# Patient Record
Sex: FEMALE | Race: WHITE | NOT HISPANIC OR LATINO | Employment: OTHER | URBAN - NONMETROPOLITAN AREA
[De-identification: names, ages, dates, MRNs, and addresses within clinical notes are randomized per-mention and may not be internally consistent; named-entity substitution may affect disease eponyms.]

---

## 2018-04-23 DIAGNOSIS — M25.561 RIGHT KNEE PAIN, UNSPECIFIED CHRONICITY: Primary | ICD-10-CM

## 2018-04-24 ENCOUNTER — OFFICE VISIT (OUTPATIENT)
Dept: ORTHOPEDIC SURGERY | Facility: CLINIC | Age: 73
End: 2018-04-24

## 2018-04-24 VITALS — HEIGHT: 62 IN | WEIGHT: 192 LBS | BODY MASS INDEX: 35.33 KG/M2 | RESPIRATION RATE: 16 BRPM

## 2018-04-24 DIAGNOSIS — M25.561 ARTHRALGIA OF KNEE, RIGHT: Primary | ICD-10-CM

## 2018-04-24 DIAGNOSIS — M94.261 CHONDROMALACIA OF RIGHT KNEE: ICD-10-CM

## 2018-04-24 PROCEDURE — 99204 OFFICE O/P NEW MOD 45 MIN: CPT | Performed by: ORTHOPAEDIC SURGERY

## 2018-04-24 RX ORDER — ASPIRIN 81 MG/1
81 TABLET, CHEWABLE ORAL DAILY
COMMUNITY

## 2018-04-24 RX ORDER — OMEPRAZOLE 20 MG/1
40 CAPSULE, DELAYED RELEASE ORAL DAILY
COMMUNITY

## 2018-04-24 RX ORDER — AMLODIPINE BESYLATE 5 MG/1
5 TABLET ORAL DAILY
COMMUNITY
End: 2019-10-15

## 2018-04-24 RX ORDER — TRIAMCINOLONE ACETONIDE 0.25 MG/G
CREAM TOPICAL 2 TIMES DAILY
COMMUNITY
End: 2019-10-15

## 2018-04-24 RX ORDER — LEVOCETIRIZINE DIHYDROCHLORIDE 5 MG/1
5 TABLET, FILM COATED ORAL EVERY EVENING
COMMUNITY

## 2018-04-24 RX ORDER — CHLORTHALIDONE 25 MG/1
25 TABLET ORAL DAILY
COMMUNITY
End: 2019-10-15

## 2018-04-24 RX ORDER — PAROXETINE 10 MG/1
10 TABLET, FILM COATED ORAL EVERY MORNING
COMMUNITY

## 2018-04-24 RX ORDER — IRBESARTAN 300 MG/1
300 TABLET ORAL NIGHTLY
COMMUNITY
End: 2019-10-15

## 2018-04-24 RX ORDER — ERGOCALCIFEROL 1.25 MG/1
50000 CAPSULE ORAL WEEKLY
COMMUNITY

## 2018-04-24 NOTE — PROGRESS NOTES
Subjective   Patient ID: Leti Castañeda is a 73 y.o. female  Pain of the Right Knee (Patient has had right knee pain for over a year, denies any injury. She had standing x rays done on 8-8-17 at [facility]. Right hand dominant.)             History of Present Illness    To 3 months of increasing pain in the right knee stiffness tightness swelling constant pain feels relief with it slightly flexed at the end of a recliner chair, initial shot of cortisone gave her wonderful relief, 2 other injections gave her very little or no relief.  Recent x-rays were negative for fracture she comes in today to discuss further treatment.  Denies hip back pain paresthesias fall injury trauma or loss of extension actively.    Review of Systems   Constitutional: Negative for diaphoresis, fever and unexpected weight change.   HENT: Negative for dental problem and sore throat.    Eyes: Negative for visual disturbance.   Respiratory: Negative for shortness of breath.    Cardiovascular: Negative for chest pain.   Gastrointestinal: Negative for abdominal pain, constipation, diarrhea, nausea and vomiting.   Genitourinary: Negative for difficulty urinating and frequency.   Musculoskeletal: Positive for arthralgias.   Neurological: Negative for headaches.   Hematological: Does not bruise/bleed easily.   All other systems reviewed and are negative.      Past Medical History:   Diagnosis Date   • Histoplasmosis    • Hypertension    • Osteoporosis    • Right wrist sprain         Past Surgical History:   Procedure Laterality Date   • ABDOMINAL AORTA STENT     • CHOLECYSTECTOMY     • COLON SURGERY         Family History   Problem Relation Age of Onset   • Hypertension Other    • Diabetes Other        Social History     Social History   • Marital status:      Spouse name: N/A   • Number of children: N/A   • Years of education: N/A     Occupational History   • Not on file.     Social History Main Topics   • Smoking status: Never  "Smoker   • Smokeless tobacco: Not on file   • Alcohol use No   • Drug use: No   • Sexual activity: Defer     Other Topics Concern   • Not on file     Social History Narrative   • No narrative on file       I have reviewed all of the above social hx, family hx, surgical hx, medications, allergies & ROS and confirm that it is accurate.    Allergies   Allergen Reactions   • Codeine GI Intolerance       Objective   Resp 16   Ht 157.5 cm (62\")   Wt 87.1 kg (192 lb)   BMI 35.12 kg/m²    Physical Exam  Constitutional: Patient is oriented to person, place, and time. Patient appears well-developed and well-nourished.   HENT:Head: Normocephalic and atraumatic.   Eyes: EOM are normal. Pupils are equal, round, and reactive to light.   Neck: Normal range of motion. Neck supple.   Cardiovascular: Normal rate.    Pulmonary/Chest: Effort normal and breath sounds normal.   Abdominal: Soft.   Neurological: Patient is alert and oriented to person, place, and time.   Skin: Skin is warm and dry.   Psychiatric: Patient has a normal mood and affect.   Nursing note and vitals reviewed.       Ortho Exam     Right knee with 2+ effusion tenderness over the anteromedial joint line, negative Lockman sign no instability with MCL stress calf supple no edema in the ankle extensor mechanism intact no tenderness of the quadriceps or patellar tendon areas.  Assessment/Plan   Review of Radiographic Studies:    Radiographic images today of affected area I personally viewed and showed no sign of acute fracture or dislocation.      Procedures     Leti was seen today for pain.    Diagnoses and all orders for this visit:    Arthralgia of knee, right  -     Cancel: XR Knee 1 or 2 View Right  -     XR Knee 3 View Right  -     MRI Knee Right Without Contrast; Future    Chondromalacia of right knee       Orthopedic activities reviewed and patient expressed appreciation and Risk, benefits, and merits of treatment alternatives reviewed with the patient and " questions answered      Recommendations/Plan:   Exercise, medications, injections, other patient advice, and return appointment as noted.    Patient agreeable to call or return sooner for any concerns.             Impression:  Right knee pain swelling probable degenerative meniscal tear with failure to improve with 3 prior cortisone injections  Plan:  MRI right knee, discuss arthroscopic debridement next visit, she will continue with icing and local modalities and meantime

## 2018-05-07 ENCOUNTER — HOSPITAL ENCOUNTER (OUTPATIENT)
Dept: MRI IMAGING | Facility: HOSPITAL | Age: 73
Discharge: HOME | End: 2018-05-07
Attending: ORTHOPAEDIC SURGERY | Admitting: ORTHOPAEDIC SURGERY

## 2018-05-07 DIAGNOSIS — M25.561 ARTHRALGIA OF KNEE, RIGHT: ICD-10-CM

## 2018-05-07 PROCEDURE — 73721 MRI JNT OF LWR EXTRE W/O DYE: CPT

## 2018-05-15 ENCOUNTER — PREP FOR SURGERY (OUTPATIENT)
Dept: OTHER | Facility: HOSPITAL | Age: 73
End: 2018-05-15

## 2018-05-15 ENCOUNTER — APPOINTMENT (OUTPATIENT)
Dept: MRI IMAGING | Facility: HOSPITAL | Age: 73
End: 2018-05-15
Attending: ORTHOPAEDIC SURGERY

## 2018-05-15 ENCOUNTER — APPOINTMENT (OUTPATIENT)
Dept: PREADMISSION TESTING | Facility: HOSPITAL | Age: 73
End: 2018-05-15

## 2018-05-15 ENCOUNTER — OFFICE VISIT (OUTPATIENT)
Dept: ORTHOPEDIC SURGERY | Facility: CLINIC | Age: 73
End: 2018-05-15

## 2018-05-15 ENCOUNTER — HOSPITAL ENCOUNTER (OUTPATIENT)
Dept: GENERAL RADIOLOGY | Facility: HOSPITAL | Age: 73
Discharge: HOME | End: 2018-05-15
Admitting: ORTHOPAEDIC SURGERY

## 2018-05-15 VITALS — HEIGHT: 62 IN | RESPIRATION RATE: 18 BRPM | WEIGHT: 192 LBS | BODY MASS INDEX: 35.33 KG/M2

## 2018-05-15 VITALS — WEIGHT: 195 LBS | BODY MASS INDEX: 35.88 KG/M2 | HEIGHT: 62 IN

## 2018-05-15 DIAGNOSIS — Z01.818 PREOP TESTING: ICD-10-CM

## 2018-05-15 DIAGNOSIS — S83.271D COMPLEX TEAR OF LATERAL MENISCUS OF RIGHT KNEE AS CURRENT INJURY, SUBSEQUENT ENCOUNTER: ICD-10-CM

## 2018-05-15 DIAGNOSIS — M25.561 ARTHRALGIA OF KNEE, RIGHT: Primary | ICD-10-CM

## 2018-05-15 DIAGNOSIS — Z01.818 PREOP TESTING: Primary | ICD-10-CM

## 2018-05-15 LAB
ALBUMIN SERPL-MCNC: 4.6 G/DL (ref 3.5–5)
ALBUMIN/GLOB SERPL: 1.4 G/DL (ref 1–2)
ALP SERPL-CCNC: 69 U/L (ref 38–126)
ALT SERPL W P-5'-P-CCNC: 47 U/L (ref 13–69)
ANION GAP SERPL CALCULATED.3IONS-SCNC: 16 MMOL/L (ref 10–20)
AST SERPL-CCNC: 39 U/L (ref 15–46)
BASOPHILS # BLD AUTO: 0.06 10*3/MM3 (ref 0–0.2)
BASOPHILS NFR BLD AUTO: 1 % (ref 0–2.5)
BILIRUB SERPL-MCNC: 0.8 MG/DL (ref 0.2–1.3)
BILIRUB UR QL STRIP: NEGATIVE
BUN BLD-MCNC: 29 MG/DL (ref 7–20)
BUN/CREAT SERPL: 32.2 (ref 7.1–23.5)
CALCIUM SPEC-SCNC: 9.7 MG/DL (ref 8.4–10.2)
CHLORIDE SERPL-SCNC: 98 MMOL/L (ref 98–107)
CLARITY UR: CLEAR
CO2 SERPL-SCNC: 31 MMOL/L (ref 26–30)
COLOR UR: YELLOW
CREAT BLD-MCNC: 0.9 MG/DL (ref 0.6–1.3)
DEPRECATED RDW RBC AUTO: 44.6 FL (ref 37–54)
EOSINOPHIL # BLD AUTO: 0.15 10*3/MM3 (ref 0–0.7)
EOSINOPHIL NFR BLD AUTO: 2.4 % (ref 0–7)
ERYTHROCYTE [DISTWIDTH] IN BLOOD BY AUTOMATED COUNT: 12.7 % (ref 11.5–14.5)
GFR SERPL CREATININE-BSD FRML MDRD: 61 ML/MIN/1.73
GLOBULIN UR ELPH-MCNC: 3.4 GM/DL
GLUCOSE BLD-MCNC: 111 MG/DL (ref 74–98)
GLUCOSE UR STRIP-MCNC: NEGATIVE MG/DL
HCT VFR BLD AUTO: 41.2 % (ref 37–47)
HGB BLD-MCNC: 13.9 G/DL (ref 12–16)
HGB UR QL STRIP.AUTO: NEGATIVE
IMM GRANULOCYTES # BLD: 0.01 10*3/MM3 (ref 0–0.06)
IMM GRANULOCYTES NFR BLD: 0.2 % (ref 0–0.6)
KETONES UR QL STRIP: NEGATIVE
LEUKOCYTE ESTERASE UR QL STRIP.AUTO: NEGATIVE
LYMPHOCYTES # BLD AUTO: 1.64 10*3/MM3 (ref 0.6–3.4)
LYMPHOCYTES NFR BLD AUTO: 26.1 % (ref 10–50)
MCH RBC QN AUTO: 32.1 PG (ref 27–31)
MCHC RBC AUTO-ENTMCNC: 33.7 G/DL (ref 30–37)
MCV RBC AUTO: 95.2 FL (ref 81–99)
MONOCYTES # BLD AUTO: 0.5 10*3/MM3 (ref 0–0.9)
MONOCYTES NFR BLD AUTO: 8 % (ref 0–12)
NEUTROPHILS # BLD AUTO: 3.92 10*3/MM3 (ref 2–6.9)
NEUTROPHILS NFR BLD AUTO: 62.3 % (ref 37–80)
NITRITE UR QL STRIP: NEGATIVE
NRBC BLD MANUAL-RTO: 0 /100 WBC (ref 0–0)
PH UR STRIP.AUTO: 6.5 [PH] (ref 5–8)
PLATELET # BLD AUTO: 258 10*3/MM3 (ref 130–400)
PMV BLD AUTO: 11.1 FL (ref 6–12)
POTASSIUM BLD-SCNC: 4 MMOL/L (ref 3.5–5.1)
PROT SERPL-MCNC: 8 G/DL (ref 6.3–8.2)
PROT UR QL STRIP: NEGATIVE
RBC # BLD AUTO: 4.33 10*6/MM3 (ref 4.2–5.4)
SODIUM BLD-SCNC: 141 MMOL/L (ref 137–145)
SP GR UR STRIP: 1.01 (ref 1–1.03)
UROBILINOGEN UR QL STRIP: NORMAL
WBC NRBC COR # BLD: 6.28 10*3/MM3 (ref 4.8–10.8)

## 2018-05-15 PROCEDURE — 36415 COLL VENOUS BLD VENIPUNCTURE: CPT

## 2018-05-15 PROCEDURE — 81003 URINALYSIS AUTO W/O SCOPE: CPT | Performed by: ORTHOPAEDIC SURGERY

## 2018-05-15 PROCEDURE — 80053 COMPREHEN METABOLIC PANEL: CPT | Performed by: ORTHOPAEDIC SURGERY

## 2018-05-15 PROCEDURE — 93005 ELECTROCARDIOGRAM TRACING: CPT | Performed by: ORTHOPAEDIC SURGERY

## 2018-05-15 PROCEDURE — 71046 X-RAY EXAM CHEST 2 VIEWS: CPT

## 2018-05-15 PROCEDURE — 85025 COMPLETE CBC W/AUTO DIFF WBC: CPT | Performed by: ORTHOPAEDIC SURGERY

## 2018-05-15 PROCEDURE — 99214 OFFICE O/P EST MOD 30 MIN: CPT | Performed by: ORTHOPAEDIC SURGERY

## 2018-05-15 PROCEDURE — 87081 CULTURE SCREEN ONLY: CPT | Performed by: ORTHOPAEDIC SURGERY

## 2018-05-15 RX ORDER — CHLORAL HYDRATE 500 MG
1000 CAPSULE ORAL NIGHTLY
COMMUNITY
End: 2020-02-04

## 2018-05-15 RX ORDER — ALENDRONATE SODIUM 70 MG/1
70 TABLET ORAL
COMMUNITY
End: 2019-10-15

## 2018-05-15 NOTE — PROGRESS NOTES
Subjective   Patient ID: Leti Castañeda is a 73 y.o. female  Follow-up and Pain of the Right Knee (Patient is here today for her right knee MRI results. She states she is still having some pain.)             History of Present Illness    73-year-old with history of right lateral knee pain initially improved with first shot of cortisone had 2 subsequent shots which did not provide relief, went for an MRI which showed degenerative tear anterolateral meniscus right knee and is here to discuss surgery for the right knee.    Medical history positive for intermittent pancreatitis which is been stable recently, has history of colon cancer status post surgery, also has history of aortic stent followed by her PCP Dr. Tee    Review of Systems   Constitutional: Negative for fever.   HENT: Negative for voice change.    Eyes: Negative for visual disturbance.   Respiratory: Negative for shortness of breath.    Cardiovascular: Negative for chest pain.   Gastrointestinal: Negative for abdominal distention and abdominal pain.   Genitourinary: Negative for dysuria.   Musculoskeletal: Positive for arthralgias. Negative for gait problem and joint swelling.   Skin: Negative for rash.   Neurological: Negative for speech difficulty.   Hematological: Does not bruise/bleed easily.   Psychiatric/Behavioral: Negative for confusion.       Past Medical History:   Diagnosis Date   • Histoplasmosis    • Hypertension    • Osteoporosis    • Right wrist sprain         Past Surgical History:   Procedure Laterality Date   • ABDOMINAL AORTA STENT     • CHOLECYSTECTOMY     • COLON SURGERY         Family History   Problem Relation Age of Onset   • Hypertension Other    • Diabetes Other        Social History     Social History   • Marital status:      Spouse name: N/A   • Number of children: N/A   • Years of education: N/A     Occupational History   • Not on file.     Social History Main Topics   • Smoking status: Never Smoker   • Smokeless  "tobacco: Never Used   • Alcohol use No   • Drug use: No   • Sexual activity: Defer     Other Topics Concern   • Not on file     Social History Narrative   • No narrative on file       I have reviewed all of the above social hx, family hx, surgical hx, medications, allergies & ROS and confirm that it is accurate.    Allergies   Allergen Reactions   • Codeine GI Intolerance         Current Outpatient Prescriptions:   •  amLODIPine (NORVASC) 5 MG tablet, Take 5 mg by mouth Daily., Disp: , Rfl:   •  aspirin 81 MG chewable tablet, Chew 81 mg Daily., Disp: , Rfl:   •  chlorthalidone (HYGROTON) 25 MG tablet, Take 25 mg by mouth Daily., Disp: , Rfl:   •  irbesartan (AVAPRO) 300 MG tablet, Take 300 mg by mouth Every Night., Disp: , Rfl:   •  levocetirizine (XYZAL) 5 MG tablet, Take 5 mg by mouth Every Evening., Disp: , Rfl:   •  omeprazole (priLOSEC) 20 MG capsule, Take 20 mg by mouth Daily., Disp: , Rfl:   •  PARoxetine (PAXIL) 10 MG tablet, Take 10 mg by mouth Every Morning., Disp: , Rfl:   •  triamcinolone (KENALOG) 0.025 % cream, Apply  topically 2 (Two) Times a Day., Disp: , Rfl:   •  vitamin D (ERGOCALCIFEROL) 02622 units capsule capsule, Take 50,000 Units by mouth 1 (One) Time Per Week., Disp: , Rfl:     Objective   Resp 18   Ht 157.5 cm (62\")   Wt 87.1 kg (192 lb)   BMI 35.12 kg/m²    Physical Exam   Cardiovascular: Regular rhythm and normal heart sounds.    Pulmonary/Chest: Effort normal and breath sounds normal.   Musculoskeletal:        Right knee: She exhibits decreased range of motion and swelling. Tenderness found. Lateral joint line tenderness noted.     Constitutional: Patient is oriented to person, place, and time. Patient appears well-developed and well-nourished.   HENT:Head: Normocephalic and atraumatic.   Eyes: EOM are normal. Pupils are equal, round, and reactive to light.   Neck: Normal range of motion. Neck supple.   Cardiovascular: Normal rate.    Pulmonary/Chest: Effort normal and breath sounds " normal.   Abdominal: Soft.   Neurological: Patient is alert and oriented to person, place, and time.   Skin: Skin is warm and dry.   Psychiatric: Patient has a normal mood and affect.   Nursing note and vitals reviewed.       Right Knee Exam     Tenderness   The patient is experiencing tenderness in the lateral joint line.       right lower leg nontender at the calf area negative Ledy sign no edema in the ankle, loss of extension 5°, positive anterolateral joint line pain with extension and external rotation, ligament exam unremarkable, minimal patellofemoral crepitus.  Assessment/Plan   Review of Radiographic Studies:    No new imaging done today.  MRI with evident meniscal tear noted.      Procedures     Leti was seen today for follow-up and pain.    Diagnoses and all orders for this visit:    Arthralgia of knee, right    Complex tear of lateral meniscus of right knee as current injury, subsequent encounter       Orthopedic activities reviewed and patient expressed appreciation, Risk, benefits, and merits of treatment alternatives reviewed with the patient and questions answered and The nature of the proposed surgery reviewed with the patient including risks, benefits, rehabilitation, recovery timeframe, and outcome expectations      Recommendations/Plan:   Surgery: Right knee diagnostic arthroscopy partial lateral meniscectomy or other procedures as indicated    Patient agreeable to call or return sooner for any concerns.          I discussed with the patient the diagnosis, condition, natural history and treatment alternatives, both surgical and nonsurgical.  I reviewed the surgical procedural details using models, diagrams and reviewing x-ray findings.  I explained the nature of the operation, anesthesia methods and the postoperative recovery.  I explained risks and complications including but not limited to infection, bleeding, blood clotting, poor healing, chronic pain, stiffness, failure of the procedure,  possible recurrence of the condition and anesthetic related risks.  The patient had opportunity to ask questions which were all answered to their satisfaction and decided to proceed with the plan for surgery.  I believe informed consent to proceed with the surgery was given verbally in my presence today.  The surgical consent form will be signed in the presence of the nursing staff prior to the surgery.    Impression:  Right lateral knee pain complex tear lateral meniscus with failure to improve with 3 prior cortisone injections, history of pancreatitis, history colon cancer, history of aortic stent  Plan:  Diagnostic arthroscopy right knee partial lateral meniscectomy or other procedures as indicated.

## 2018-05-15 NOTE — H&P
Subjective      Patient ID: Leti Castañeda is a 73 y.o. female  Follow-up and Pain of the Right Knee (Patient is here today for her right knee MRI results. She states she is still having some pain.)               History of Present Illness     73-year-old with history of right lateral knee pain initially improved with first shot of cortisone had 2 subsequent shots which did not provide relief, went for an MRI which showed degenerative tear anterolateral meniscus right knee and is here to discuss surgery for the right knee.     Medical history positive for intermittent pancreatitis which is been stable recently, has history of colon cancer status post surgery, also has history of aortic stent followed by her PCP Dr. Tee     Review of Systems   Constitutional: Negative for fever.   HENT: Negative for voice change.    Eyes: Negative for visual disturbance.   Respiratory: Negative for shortness of breath.    Cardiovascular: Negative for chest pain.   Gastrointestinal: Negative for abdominal distention and abdominal pain.   Genitourinary: Negative for dysuria.   Musculoskeletal: Positive for arthralgias. Negative for gait problem and joint swelling.   Skin: Negative for rash.   Neurological: Negative for speech difficulty.   Hematological: Does not bruise/bleed easily.   Psychiatric/Behavioral: Negative for confusion.         Medical History        Past Medical History:   Diagnosis Date   • Histoplasmosis     • Hypertension     • Osteoporosis     • Right wrist sprain              Surgical History   Past Surgical History:   Procedure Laterality Date   • ABDOMINAL AORTA STENT       • CHOLECYSTECTOMY       • COLON SURGERY                      Family History   Problem Relation Age of Onset   • Hypertension Other     • Diabetes Other           Social History   Social History            Social History   • Marital status:        Spouse name: N/A   • Number of children: N/A   • Years of education: N/A         "  Occupational History   • Not on file.           Social History Main Topics   • Smoking status: Never Smoker   • Smokeless tobacco: Never Used   • Alcohol use No   • Drug use: No   • Sexual activity: Defer           Other Topics Concern   • Not on file          Social History Narrative   • No narrative on file            I have reviewed all of the above social hx, family hx, surgical hx, medications, allergies & ROS and confirm that it is accurate.          Allergies   Allergen Reactions   • Codeine GI Intolerance            Current Outpatient Prescriptions:   •  amLODIPine (NORVASC) 5 MG tablet, Take 5 mg by mouth Daily., Disp: , Rfl:   •  aspirin 81 MG chewable tablet, Chew 81 mg Daily., Disp: , Rfl:   •  chlorthalidone (HYGROTON) 25 MG tablet, Take 25 mg by mouth Daily., Disp: , Rfl:   •  irbesartan (AVAPRO) 300 MG tablet, Take 300 mg by mouth Every Night., Disp: , Rfl:   •  levocetirizine (XYZAL) 5 MG tablet, Take 5 mg by mouth Every Evening., Disp: , Rfl:   •  omeprazole (priLOSEC) 20 MG capsule, Take 20 mg by mouth Daily., Disp: , Rfl:   •  PARoxetine (PAXIL) 10 MG tablet, Take 10 mg by mouth Every Morning., Disp: , Rfl:   •  triamcinolone (KENALOG) 0.025 % cream, Apply  topically 2 (Two) Times a Day., Disp: , Rfl:   •  vitamin D (ERGOCALCIFEROL) 69892 units capsule capsule, Take 50,000 Units by mouth 1 (One) Time Per Week., Disp: , Rfl:         Objective      Resp 18   Ht 157.5 cm (62\")   Wt 87.1 kg (192 lb)   BMI 35.12 kg/m²    Physical Exam   Cardiovascular: Regular rhythm and normal heart sounds.    Pulmonary/Chest: Effort normal and breath sounds normal.   Musculoskeletal:        Right knee: She exhibits decreased range of motion and swelling. Tenderness found. Lateral joint line tenderness noted.      Constitutional: Patient is oriented to person, place, and time. Patient appears well-developed and well-nourished.   HENT:Head: Normocephalic and atraumatic.   Eyes: EOM are normal. Pupils are equal, " round, and reactive to light.   Neck: Normal range of motion. Neck supple.   Cardiovascular: Normal rate.    Pulmonary/Chest: Effort normal and breath sounds normal.   Abdominal: Soft.   Neurological: Patient is alert and oriented to person, place, and time.   Skin: Skin is warm and dry.   Psychiatric: Patient has a normal mood and affect.   Nursing note and vitals reviewed.        Right Knee Exam      Tenderness   The patient is experiencing tenderness in the lateral joint line.      right lower leg nontender at the calf area negative Ledy sign no edema in the ankle, loss of extension 5°, positive anterolateral joint line pain with extension and external rotation, ligament exam unremarkable, minimal patellofemoral crepitus.     Assessment/Plan      Review of Radiographic Studies:    No new imaging done today.  MRI with evident meniscal tear noted.        Procedures     Leti was seen today for follow-up and pain.     Diagnoses and all orders for this visit:     Arthralgia of knee, right     Complex tear of lateral meniscus of right knee as current injury, subsequent encounter        Orthopedic activities reviewed and patient expressed appreciation, Risk, benefits, and merits of treatment alternatives reviewed with the patient and questions answered and The nature of the proposed surgery reviewed with the patient including risks, benefits, rehabilitation, recovery timeframe, and outcome expectations        Recommendations/Plan:   Surgery: Right knee diagnostic arthroscopy partial lateral meniscectomy or other procedures as indicated     Patient agreeable to call or return sooner for any concerns.            I discussed with the patient the diagnosis, condition, natural history and treatment alternatives, both surgical and nonsurgical.  I reviewed the surgical procedural details using models, diagrams and reviewing x-ray findings.  I explained the nature of the operation, anesthesia methods and the postoperative  recovery.  I explained risks and complications including but not limited to infection, bleeding, blood clotting, poor healing, chronic pain, stiffness, failure of the procedure, possible recurrence of the condition and anesthetic related risks.  The patient had opportunity to ask questions which were all answered to their satisfaction and decided to proceed with the plan for surgery.  I believe informed consent to proceed with the surgery was given verbally in my presence today.  The surgical consent form will be signed in the presence of the nursing staff prior to the surgery.     Impression:  Right lateral knee pain complex tear lateral meniscus with failure to improve with 3 prior cortisone injections, history of pancreatitis, history colon cancer, history of aortic stent  Plan:  Diagnostic arthroscopy right knee partial lateral meniscectomy or other procedures as indicated.

## 2018-05-19 LAB — MRSA SPEC QL CULT: NORMAL

## 2018-05-29 ENCOUNTER — DOCUMENTATION (OUTPATIENT)
Dept: ORTHOPEDIC SURGERY | Facility: CLINIC | Age: 73
End: 2018-05-29

## 2018-05-29 NOTE — PROGRESS NOTES
Made patient appt today at 10:45 with Dr Pruitt, at [facility] in [place] for cardiac clearance prior to surgery 5/30/18 with Dr Terry, informed patient, faxed office notes and abnormal EKG to Roseann at [phone]

## 2018-05-30 ENCOUNTER — ANESTHESIA (OUTPATIENT)
Dept: PERIOP | Facility: HOSPITAL | Age: 73
End: 2018-05-30

## 2018-05-30 ENCOUNTER — ANESTHESIA EVENT (OUTPATIENT)
Dept: PERIOP | Facility: HOSPITAL | Age: 73
End: 2018-05-30

## 2018-05-30 ENCOUNTER — HOSPITAL ENCOUNTER (OUTPATIENT)
Facility: HOSPITAL | Age: 73
Setting detail: HOSPITAL OUTPATIENT SURGERY
Discharge: HOME | End: 2018-05-30
Attending: ORTHOPAEDIC SURGERY | Admitting: ORTHOPAEDIC SURGERY

## 2018-05-30 VITALS
TEMPERATURE: 98.1 F | HEIGHT: 62 IN | WEIGHT: 195 LBS | RESPIRATION RATE: 16 BRPM | SYSTOLIC BLOOD PRESSURE: 156 MMHG | DIASTOLIC BLOOD PRESSURE: 62 MMHG | HEART RATE: 66 BPM | OXYGEN SATURATION: 95 % | BODY MASS INDEX: 35.88 KG/M2

## 2018-05-30 DIAGNOSIS — Z01.818 PREOP TESTING: ICD-10-CM

## 2018-05-30 PROCEDURE — 29881 ARTHRS KNE SRG MNISECTMY M/L: CPT | Performed by: ORTHOPAEDIC SURGERY

## 2018-05-30 PROCEDURE — 25010000002 FENTANYL CITRATE (PF) 100 MCG/2ML SOLUTION: Performed by: NURSE ANESTHETIST, CERTIFIED REGISTERED

## 2018-05-30 PROCEDURE — 25010000002 PROPOFOL 1000 MG/ML EMULSION: Performed by: NURSE ANESTHETIST, CERTIFIED REGISTERED

## 2018-05-30 PROCEDURE — 25010000003 CEFAZOLIN PER 500 MG: Performed by: ORTHOPAEDIC SURGERY

## 2018-05-30 RX ORDER — EPINEPHRINE 1 MG/ML
INJECTION, SOLUTION, CONCENTRATE INTRAVENOUS
Status: DISCONTINUED
Start: 2018-05-30 | End: 2018-05-30 | Stop reason: HOSPADM

## 2018-05-30 RX ORDER — FENTANYL CITRATE 50 UG/ML
INJECTION, SOLUTION INTRAMUSCULAR; INTRAVENOUS AS NEEDED
Status: DISCONTINUED | OUTPATIENT
Start: 2018-05-30 | End: 2018-05-30 | Stop reason: SURG

## 2018-05-30 RX ORDER — SODIUM CHLORIDE 0.9 % (FLUSH) 0.9 %
3 SYRINGE (ML) INJECTION AS NEEDED
Status: DISCONTINUED | OUTPATIENT
Start: 2018-05-30 | End: 2018-05-30 | Stop reason: HOSPADM

## 2018-05-30 RX ORDER — ONDANSETRON 4 MG/1
4 TABLET, FILM COATED ORAL ONCE AS NEEDED
Status: DISCONTINUED | OUTPATIENT
Start: 2018-05-30 | End: 2018-05-30 | Stop reason: HOSPADM

## 2018-05-30 RX ORDER — LIDOCAINE HYDROCHLORIDE 10 MG/ML
INJECTION, SOLUTION INFILTRATION; PERINEURAL AS NEEDED
Status: DISCONTINUED | OUTPATIENT
Start: 2018-05-30 | End: 2018-05-30 | Stop reason: HOSPADM

## 2018-05-30 RX ORDER — SODIUM CHLORIDE, SODIUM LACTATE, POTASSIUM CHLORIDE, CALCIUM CHLORIDE 600; 310; 30; 20 MG/100ML; MG/100ML; MG/100ML; MG/100ML
1000 INJECTION, SOLUTION INTRAVENOUS CONTINUOUS
Status: DISCONTINUED | OUTPATIENT
Start: 2018-05-30 | End: 2018-05-30 | Stop reason: HOSPADM

## 2018-05-30 RX ORDER — HYDROCODONE BITARTRATE AND ACETAMINOPHEN 5; 325 MG/1; MG/1
1-2 TABLET ORAL EVERY 6 HOURS PRN
Qty: 20 TABLET | Refills: 0 | Status: SHIPPED | OUTPATIENT
Start: 2018-05-30 | End: 2019-10-15

## 2018-05-30 RX ORDER — ONDANSETRON 2 MG/ML
4 INJECTION INTRAMUSCULAR; INTRAVENOUS ONCE AS NEEDED
Status: DISCONTINUED | OUTPATIENT
Start: 2018-05-30 | End: 2018-05-30 | Stop reason: HOSPADM

## 2018-05-30 RX ORDER — OXYCODONE HYDROCHLORIDE AND ACETAMINOPHEN 5; 325 MG/1; MG/1
1 TABLET ORAL EVERY 6 HOURS PRN
Status: DISCONTINUED | OUTPATIENT
Start: 2018-05-30 | End: 2018-05-30 | Stop reason: HOSPADM

## 2018-05-30 RX ORDER — BUPIVACAINE HYDROCHLORIDE AND EPINEPHRINE 5; 5 MG/ML; UG/ML
INJECTION, SOLUTION EPIDURAL; INTRACAUDAL; PERINEURAL AS NEEDED
Status: DISCONTINUED | OUTPATIENT
Start: 2018-05-30 | End: 2018-05-30 | Stop reason: HOSPADM

## 2018-05-30 RX ORDER — LIDOCAINE HYDROCHLORIDE AND EPINEPHRINE 10; 10 MG/ML; UG/ML
INJECTION, SOLUTION INFILTRATION; PERINEURAL
Status: DISCONTINUED
Start: 2018-05-30 | End: 2018-05-30 | Stop reason: HOSPADM

## 2018-05-30 RX ORDER — BUPIVACAINE HYDROCHLORIDE AND EPINEPHRINE 5; 5 MG/ML; UG/ML
INJECTION, SOLUTION EPIDURAL; INTRACAUDAL; PERINEURAL
Status: DISCONTINUED
Start: 2018-05-30 | End: 2018-05-30 | Stop reason: HOSPADM

## 2018-05-30 RX ORDER — PROPOFOL 10 MG/ML
VIAL (ML) INTRAVENOUS AS NEEDED
Status: DISCONTINUED | OUTPATIENT
Start: 2018-05-30 | End: 2018-05-30

## 2018-05-30 RX ADMIN — FENTANYL CITRATE 25 MCG: 50 INJECTION, SOLUTION INTRAMUSCULAR; INTRAVENOUS at 10:20

## 2018-05-30 RX ADMIN — PROPOFOL 75 MCG/KG/MIN: 10 INJECTION, EMULSION INTRAVENOUS at 10:01

## 2018-05-30 RX ADMIN — LIDOCAINE HYDROCHLORIDE 60 MG: 20 INJECTION, SOLUTION INTRAVENOUS at 10:01

## 2018-05-30 RX ADMIN — FENTANYL CITRATE 25 MCG: 50 INJECTION, SOLUTION INTRAMUSCULAR; INTRAVENOUS at 10:10

## 2018-05-30 RX ADMIN — FENTANYL CITRATE 50 MCG: 50 INJECTION, SOLUTION INTRAMUSCULAR; INTRAVENOUS at 09:58

## 2018-05-30 RX ADMIN — SODIUM CHLORIDE, POTASSIUM CHLORIDE, SODIUM LACTATE AND CALCIUM CHLORIDE 1000 ML: 600; 310; 30; 20 INJECTION, SOLUTION INTRAVENOUS at 08:56

## 2018-05-30 RX ADMIN — CEFAZOLIN SODIUM 2 G: 2 SOLUTION INTRAVENOUS at 10:05

## 2018-05-30 NOTE — ANESTHESIA PREPROCEDURE EVALUATION
Anesthesia Evaluation     Patient summary reviewed and Nursing notes reviewed   NPO Solid Status: > 8 hours  NPO Liquid Status: > 8 hours           Airway   Mallampati: II  TM distance: >3 FB  No difficulty expected  Dental      Pulmonary    Cardiovascular   Exercise tolerance: good (4-7 METS)    ECG reviewed  Rhythm: regular  Rate: normal    (+) hypertension, CAD,     ROS comment: Stent 2001    Cardiac clearance on chart    Neuro/Psych  (+) psychiatric history Anxiety and Depression,     GI/Hepatic/Renal/Endo    (+)  GERD,  hepatitis B,     Musculoskeletal     Abdominal    Substance History      OB/GYN          Other   (+) arthritis   history of cancer remission                  Anesthesia Plan    ASA 3     general and MAC     intravenous induction   Anesthetic plan and risks discussed with patient.    Plan discussed with CRNA.

## 2018-05-30 NOTE — ANESTHESIA POSTPROCEDURE EVALUATION
Patient: Leti Castañeda    Procedure Summary     Date:  05/30/18 Room / Location:  [facility]  /  [facility]    Anesthesia Start:  0954 Anesthesia Stop:  1032    Procedure:  Diagnostic arthroscopy right knee partial lateral meniscectomy (Right Knee) Diagnosis:       Preop testing      (Preop testing [Z01.818])    Surgeon:  Luciano Terry MD Provider:  Betty Chahal CRNA    Anesthesia Type:  general ASA Status:  3          Anesthesia Type: general  Last vitals  BP   131/52 (05/30/18 1032)   Temp   98.1 °F (36.7 °C) (05/30/18 1032)   Pulse   75 (05/30/18 1032)   Resp   16 (05/30/18 1032)     SpO2   94 % (05/30/18 1032)     Post Anesthesia Care and Evaluation    Patient location during evaluation: PHASE II  Patient participation: complete - patient participated  Level of consciousness: awake and alert  Pain score: 2  Pain management: satisfactory to patient  Airway patency: patent  Anesthetic complications: No anesthetic complications  PONV Status: none  Cardiovascular status: acceptable and stable  Respiratory status: acceptable  Hydration status: acceptable

## 2018-06-12 ENCOUNTER — OFFICE VISIT (OUTPATIENT)
Dept: ORTHOPEDIC SURGERY | Facility: CLINIC | Age: 73
End: 2018-06-12

## 2018-06-12 VITALS — WEIGHT: 192 LBS | RESPIRATION RATE: 18 BRPM | HEIGHT: 62 IN | BODY MASS INDEX: 35.33 KG/M2

## 2018-06-12 DIAGNOSIS — S83.271D COMPLEX TEAR OF LATERAL MENISCUS OF RIGHT KNEE AS CURRENT INJURY, SUBSEQUENT ENCOUNTER: Primary | ICD-10-CM

## 2018-06-12 PROCEDURE — 99024 POSTOP FOLLOW-UP VISIT: CPT | Performed by: ORTHOPAEDIC SURGERY

## 2018-06-12 NOTE — PROGRESS NOTES
Subjective   Patient ID: Leti Castañeda is a 73 y.o. female  Post-op of the Right Knee (Patient is here for her 1st post op visit and states her pain is better but it still hurts.) and Suture / Staple Removal             History of Present Illness    Status post partial lateral meniscectomy doing very well has no pain good range no wound incisional issues    Review of Systems   Constitutional: Negative for fever.   HENT: Negative for voice change.    Eyes: Negative for visual disturbance.   Respiratory: Negative for shortness of breath.    Cardiovascular: Negative for chest pain.   Gastrointestinal: Negative for abdominal distention and abdominal pain.   Genitourinary: Negative for dysuria.   Musculoskeletal: Positive for arthralgias. Negative for gait problem and joint swelling.   Skin: Negative for rash.   Neurological: Negative for speech difficulty.   Hematological: Does not bruise/bleed easily.   Psychiatric/Behavioral: Negative for confusion.       Past Medical History:   Diagnosis Date   • Arthritis    • Cancer     HX OF COLON    • Coronary artery disease    • Depression    • GERD (gastroesophageal reflux disease)    • Histoplasmosis    • History of transfusion    • Hypertension    • IBS (irritable bowel syndrome)    • Osteoporosis    • Pancreatitis     WAS TOLD IN [place]. POSSIBLE HEP . B IN 2010 THIS WAS AT A URGENT CARE IN [place]   • Right wrist sprain         Past Surgical History:   Procedure Laterality Date   • ABDOMINAL AORTA STENT      PATIENT STATES DOES NOT HAVE    • CARDIAC CATHETERIZATION     • CHOLECYSTECTOMY     • COLON SURGERY     • CORONARY ANGIOPLASTY WITH STENT PLACEMENT  2001   • KNEE ARTHROSCOPY Right 5/30/2018    Procedure: Diagnostic arthroscopy right knee partial lateral meniscectomy;  Surgeon: Luciano Terry MD;  Location: [facility];  Service: Orthopedics   • OTHER SURGICAL HISTORY      PORTACATH REMOVAL   • PORTACATH PLACEMENT         Family History   Problem Relation Age of Onset   •  Hypertension Other    • Diabetes Other        Social History     Social History   • Marital status:      Spouse name: N/A   • Number of children: N/A   • Years of education: N/A     Occupational History   • Not on file.     Social History Main Topics   • Smoking status: Never Smoker   • Smokeless tobacco: Never Used   • Alcohol use No   • Drug use: No   • Sexual activity: Defer     Other Topics Concern   • Not on file     Social History Narrative   • No narrative on file       I have reviewed all of the above social hx, family hx, surgical hx, medications, allergies & ROS and confirm that it is accurate.    Allergies   Allergen Reactions   • Codeine GI Intolerance         Current Outpatient Prescriptions:   •  alendronate (FOSAMAX) 70 MG tablet, Take 70 mg by mouth Every 7 (Seven) Days., Disp: , Rfl:   •  amLODIPine (NORVASC) 5 MG tablet, Take 5 mg by mouth Daily., Disp: , Rfl:   •  aspirin 81 MG chewable tablet, Chew 81 mg Daily., Disp: , Rfl:   •  chlorthalidone (HYGROTON) 25 MG tablet, Take 25 mg by mouth Daily., Disp: , Rfl:   •  HYDROcodone-acetaminophen (NORCO) 5-325 MG per tablet, Take 1-2 tablets by mouth Every 6 (Six) Hours As Needed for pain, Disp: 20 tablet, Rfl: 0  •  irbesartan (AVAPRO) 300 MG tablet, Take 300 mg by mouth Every Night., Disp: , Rfl:   •  levocetirizine (XYZAL) 5 MG tablet, Take 5 mg by mouth Every Evening., Disp: , Rfl:   •  NON FORMULARY, Take 1 capsule by mouth Daily. MEGHA RED, Disp: , Rfl:   •  Omega-3 Fatty Acids (FISH OIL) 1000 MG capsule capsule, Take 1,000 mg by mouth Every Night., Disp: , Rfl:   •  omeprazole (priLOSEC) 20 MG capsule, Take 20 mg by mouth Daily., Disp: , Rfl:   •  PARoxetine (PAXIL) 10 MG tablet, Take 10 mg by mouth Every Morning., Disp: , Rfl:   •  triamcinolone (KENALOG) 0.025 % cream, Apply  topically 2 (Two) Times a Day., Disp: , Rfl:   •  vitamin D (ERGOCALCIFEROL) 13368 units capsule capsule, Take 50,000 Units by mouth 1 (One) Time Per Week., Disp: ,  "Rfl:     Objective   Resp 18   Ht 157.5 cm (62\")   Wt 87.1 kg (192 lb)   BMI 35.12 kg/m²    Physical Exam  Constitutional: Patient is oriented to person, place, and time. Patient appears well-developed and well-nourished.   HENT:Head: Normocephalic and atraumatic.   Eyes: EOM are normal. Pupils are equal, round, and reactive to light.   Neck: Normal range of motion. Neck supple.   Cardiovascular: Normal rate.    Pulmonary/Chest: Effort normal and breath sounds normal.   Abdominal: Soft.   Neurological: Patient is alert and oriented to person, place, and time.   Skin: Skin is warm and dry.   Psychiatric: Patient has a normal mood and affect.   Nursing note and vitals reviewed.       Ortho Exam   Incisions well-healed sutures removed Steri-Strips applied calf supple no edema in the ankle Homans sign negative full knee extension no tenderness the medial or lateral joint line    Assessment/Plan   Review of Radiographic Studies:    No new imaging done today.      Procedures     Leti was seen today for suture / staple removal and post-op.    Diagnoses and all orders for this visit:    Complex tear of lateral meniscus of right knee as current injury, subsequent encounter       Orthopedic activities reviewed and patient expressed appreciation      Recommendations/Plan:   Work/Activity Status: May perform usual activities as tolerated    Patient agreeable to call or return sooner for any concerns.             Impression:  Status post arthroscopic partial lateral meniscectomy right knee  Plan:  Range of motion weight-bear as tolerated home exercise return when necessary  "

## 2018-08-07 ENCOUNTER — OFFICE VISIT (OUTPATIENT)
Dept: ORTHOPEDIC SURGERY | Facility: CLINIC | Age: 73
End: 2018-08-07

## 2018-08-07 VITALS — HEIGHT: 62 IN | BODY MASS INDEX: 35.7 KG/M2 | WEIGHT: 194 LBS | RESPIRATION RATE: 18 BRPM

## 2018-08-07 DIAGNOSIS — M17.10 ARTHRITIS OF KNEE: ICD-10-CM

## 2018-08-07 DIAGNOSIS — S83.271D COMPLEX TEAR OF LATERAL MENISCUS OF RIGHT KNEE AS CURRENT INJURY, SUBSEQUENT ENCOUNTER: Primary | ICD-10-CM

## 2018-08-07 PROCEDURE — 20610 DRAIN/INJ JOINT/BURSA W/O US: CPT | Performed by: ORTHOPAEDIC SURGERY

## 2018-08-07 PROCEDURE — 99024 POSTOP FOLLOW-UP VISIT: CPT | Performed by: ORTHOPAEDIC SURGERY

## 2018-08-07 NOTE — PROGRESS NOTES
Subjective   Patient ID: Leti Castañeda is a 73 y.o. female  Follow-up and Pain of the Right Knee (Patient is here today to follow up on her right knee and states she is having a lot of pain in the knee right now.)             History of Present Illness    Persistent burning type pain right knee she says it feels it has a fever and it at times after walking long distances, still slight relief taking ibuprofen but she has still limited use of ibuprofen due to history of pancreatitis.  Denies fall injury or acute injury to the right knee, status post right knee arthroscopic partial meniscectomy.  Denies calf pain hip pain numbness or tingling.  Recalls having cortisone injections to the right knee prior to her knee surgery which helped for short period time, has not had recent injections.    Review of Systems   Constitutional: Negative for fever.   HENT: Negative for voice change.    Eyes: Negative for visual disturbance.   Respiratory: Negative for shortness of breath.    Cardiovascular: Negative for chest pain.   Gastrointestinal: Negative for abdominal distention and abdominal pain.   Genitourinary: Negative for dysuria.   Musculoskeletal: Positive for arthralgias. Negative for gait problem and joint swelling.   Skin: Negative for rash.   Neurological: Negative for speech difficulty.   Hematological: Does not bruise/bleed easily.   Psychiatric/Behavioral: Negative for confusion.       Past Medical History:   Diagnosis Date   • Arthritis    • Cancer (CMS/HCC)     HX OF COLON    • Coronary artery disease    • Depression    • GERD (gastroesophageal reflux disease)    • Histoplasmosis    • History of transfusion    • Hypertension    • IBS (irritable bowel syndrome)    • Osteoporosis    • Pancreatitis     [place]OLD [place] POSSIBLE HEP . B IN 2010 THIS WAS AT A [place]RGENT [place]   • Right wrist sprain         Past Surgical History:   Procedure Laterality Date   • ABDOMINAL AORTA STENT      PATIENT STATES DOES NOT HAVE     • CARDIAC CATHETERIZATION     • CHOLECYSTECTOMY     • COLON SURGERY     • CORONARY ANGIOPLASTY WITH STENT PLACEMENT  2001   • KNEE ARTHROSCOPY Right 5/30/2018    Procedure: Diagnostic arthroscopy right knee partial lateral meniscectomy;  Surgeon: Luciano Terry MD;  Location: [facility];  Service: Orthopedics   • OTHER SURGICAL HISTORY      PORTACATH REMOVAL   • PORTACATH PLACEMENT         Family History   Problem Relation Age of Onset   • Hypertension Other    • Diabetes Other        Social History     Social History   • Marital status:      Spouse name: N/A   • Number of children: N/A   • Years of education: N/A     Occupational History   • Not on file.     Social History Main Topics   • Smoking status: Never Smoker   • Smokeless tobacco: Never Used   • Alcohol use No   • Drug use: No   • Sexual activity: Defer     Other Topics Concern   • Not on file     Social History Narrative   • No narrative on file       I have reviewed all of the above social hx, family hx, surgical hx, medications, allergies & ROS and confirm that it is accurate.    Allergies   Allergen Reactions   • Codeine GI Intolerance         Current Outpatient Prescriptions:   •  alendronate (FOSAMAX) 70 MG tablet, Take 70 mg by mouth Every 7 (Seven) Days., Disp: , Rfl:   •  amLODIPine (NORVASC) 5 MG tablet, Take 5 mg by mouth Daily., Disp: , Rfl:   •  aspirin 81 MG chewable tablet, Chew 81 mg Daily., Disp: , Rfl:   •  chlorthalidone (HYGROTON) 25 MG tablet, Take 25 mg by mouth Daily., Disp: , Rfl:   •  HYDROcodone-acetaminophen (NORCO) 5-325 MG per tablet, Take 1-2 tablets by mouth Every 6 (Six) Hours As Needed for pain, Disp: 20 tablet, Rfl: 0  •  irbesartan (AVAPRO) 300 MG tablet, Take 300 mg by mouth Every Night., Disp: , Rfl:   •  levocetirizine (XYZAL) 5 MG tablet, Take 5 mg by mouth Every Evening., Disp: , Rfl:   •  NON FORMULARY, Take 1 capsule by mouth Daily. MEGHA GUERRERO, Disp: , Rfl:   •  Omega-3 Fatty Acids (FISH OIL) 1000 MG capsule  "capsule, Take 1,000 mg by mouth Every Night., Disp: , Rfl:   •  omeprazole (priLOSEC) 20 MG capsule, Take 20 mg by mouth Daily., Disp: , Rfl:   •  PARoxetine (PAXIL) 10 MG tablet, Take 10 mg by mouth Every Morning., Disp: , Rfl:   •  triamcinolone (KENALOG) 0.025 % cream, Apply  topically 2 (Two) Times a Day., Disp: , Rfl:   •  vitamin D (ERGOCALCIFEROL) 12661 units capsule capsule, Take 50,000 Units by mouth 1 (One) Time Per Week., Disp: , Rfl:     Objective   Resp 18   Ht 157.5 cm (62\")   Wt 88 kg (194 lb)   BMI 35.48 kg/m²    Physical Exam  Constitutional: Patient is oriented to person, place, and time. Patient appears well-developed and well-nourished.   HENT:Head: Normocephalic and atraumatic.   Eyes: EOM are normal. Pupils are equal, round, and reactive to light.   Neck: Normal range of motion. Neck supple.   Cardiovascular: Normal rate.    Pulmonary/Chest: Effort normal and breath sounds normal.   Abdominal: Soft.   Neurological: Patient is alert and oriented to person, place, and time.   Skin: Skin is warm and dry.   Psychiatric: Patient has a normal mood and affect.   Nursing note and vitals reviewed.       Ortho Exam   Right knee: Slight effusion minimal warmth skin appears normal and healed around surgical incisional sites, extension -3 full flexion tenderness is present along the medial compartment with range of motion calf supple no edema in the ankle Ledy sign negative no posterior lateral joint line pain minimal patellofemoral crepitus    Assessment/Plan   Review of Radiographic Studies:    Radiographic images today of affected area I personally viewed and showed no sign of acute fracture or dislocation.      Large Joint Arthrocentesis  Date/Time: 8/7/2018 11:04 AM  Consent given by: patient  Site marked: site marked  Timeout: Immediately prior to procedure a time out was called to verify the correct patient, procedure, equipment, support staff and site/side marked as required   Supporting " Documentation  Indications: pain   Procedure Details  Location: knee - R knee  Needle size: 22 G  Medications administered: 25 mg sodium hyaluronate 25 MG/2.5ML  Patient tolerance: patient tolerated the procedure well with no immediate complications           Leti was seen today for follow-up and pain.    Diagnoses and all orders for this visit:    Complex tear of lateral meniscus of right knee as current injury, subsequent encounter  -     XR Knee 3 View Right  -     Large Joint Arthrocentesis    Arthritis of knee       Orthopedic activities reviewed and patient expressed appreciation and Risk, benefits, and merits of treatment alternatives reviewed with the patient and questions answered      Recommendations/Plan:   Work/Activity Status: May perform usual activities as tolerated    Patient agreeable to call or return sooner for any concerns.         Discussed with patient and her daughter the options of treatment for arthritis, reviewed pros cons risks complications and nature of treatment with both viscous injections and steroid injections with advantages and disadvantages of each--she and her daughter carefully considered had all questions answered and wished for the series of Synvisc injections to the right knee understanding risks and complications.    Impression:  Right knee status post partial meniscectomy with early arthrosis  Plan:  Return for repeat Synvisc injection next week #2 in a series of 5

## 2018-08-14 ENCOUNTER — OFFICE VISIT (OUTPATIENT)
Dept: ORTHOPEDIC SURGERY | Facility: CLINIC | Age: 73
End: 2018-08-14

## 2018-08-14 VITALS — HEIGHT: 62 IN | BODY MASS INDEX: 35.7 KG/M2 | RESPIRATION RATE: 18 BRPM | WEIGHT: 194 LBS

## 2018-08-14 DIAGNOSIS — M17.10 ARTHRITIS OF KNEE: Primary | ICD-10-CM

## 2018-08-14 PROCEDURE — 20610 DRAIN/INJ JOINT/BURSA W/O US: CPT | Performed by: ORTHOPAEDIC SURGERY

## 2018-08-14 NOTE — PROGRESS NOTES
Subjective   Patient ID: Leti Castañeda is a 73 y.o. female  Follow-up, Pain, and Injections of the Right Knee (Patient is here today for her 2nd supartz./)             History of Present Illness    She returns for injection viscous #2 right knee had no adverse reaction to last week's injection no new falls or injuries otherwise in stable medical health    Review of Systems   Constitutional: Negative for fever.   HENT: Negative for voice change.    Eyes: Negative for visual disturbance.   Respiratory: Negative for shortness of breath.    Cardiovascular: Negative for chest pain.   Gastrointestinal: Negative for abdominal distention and abdominal pain.   Genitourinary: Negative for dysuria.   Musculoskeletal: Positive for arthralgias. Negative for gait problem and joint swelling.   Skin: Negative for rash.   Neurological: Negative for speech difficulty.   Hematological: Does not bruise/bleed easily.   Psychiatric/Behavioral: Negative for confusion.       Past Medical History:   Diagnosis Date   • Arthritis    • Cancer (CMS/HCC)     HX OF COLON    • Coronary artery disease    • Depression    • GERD (gastroesophageal reflux disease)    • Histoplasmosis    • History of transfusion    • Hypertension    • IBS (irritable bowel syndrome)    • Osteoporosis    • Pancreatitis     W[place]OLD [place]. POSSIBLE HEP . B IN 2010 THIS WAS AT A [place]RGENT [place]   • Right wrist sprain         Past Surgical History:   Procedure Laterality Date   • ABDOMINAL AORTA STENT      PATIENT STATES DOES NOT HAVE    • CARDIAC CATHETERIZATION     • CHOLECYSTECTOMY     • COLON SURGERY     • CORONARY ANGIOPLASTY WITH STENT PLACEMENT  2001   • KNEE ARTHROSCOPY Right 5/30/2018    Procedure: Diagnostic arthroscopy right knee partial lateral meniscectomy;  Surgeon: Luciano Terry MD;  Location: [facility];  Service: Orthopedics   • OTHER SURGICAL HISTORY      PORTACATH REMOVAL   • PORTACATH PLACEMENT         Family History   Problem Relation Age of Onset    • Hypertension Other    • Diabetes Other        Social History     Social History   • Marital status:      Spouse name: N/A   • Number of children: N/A   • Years of education: N/A     Occupational History   • Not on file.     Social History Main Topics   • Smoking status: Never Smoker   • Smokeless tobacco: Never Used   • Alcohol use No   • Drug use: No   • Sexual activity: Defer     Other Topics Concern   • Not on file     Social History Narrative   • No narrative on file       I have reviewed all of the above social hx, family hx, surgical hx, medications, allergies & ROS and confirm that it is accurate.    Allergies   Allergen Reactions   • Codeine GI Intolerance         Current Outpatient Prescriptions:   •  alendronate (FOSAMAX) 70 MG tablet, Take 70 mg by mouth Every 7 (Seven) Days., Disp: , Rfl:   •  amLODIPine (NORVASC) 5 MG tablet, Take 5 mg by mouth Daily., Disp: , Rfl:   •  aspirin 81 MG chewable tablet, Chew 81 mg Daily., Disp: , Rfl:   •  chlorthalidone (HYGROTON) 25 MG tablet, Take 25 mg by mouth Daily., Disp: , Rfl:   •  HYDROcodone-acetaminophen (NORCO) 5-325 MG per tablet, Take 1-2 tablets by mouth Every 6 (Six) Hours As Needed for pain, Disp: 20 tablet, Rfl: 0  •  irbesartan (AVAPRO) 300 MG tablet, Take 300 mg by mouth Every Night., Disp: , Rfl:   •  levocetirizine (XYZAL) 5 MG tablet, Take 5 mg by mouth Every Evening., Disp: , Rfl:   •  NON FORMULARY, Take 1 capsule by mouth Daily. MEGHA RED, Disp: , Rfl:   •  Omega-3 Fatty Acids (FISH OIL) 1000 MG capsule capsule, Take 1,000 mg by mouth Every Night., Disp: , Rfl:   •  omeprazole (priLOSEC) 20 MG capsule, Take 20 mg by mouth Daily., Disp: , Rfl:   •  PARoxetine (PAXIL) 10 MG tablet, Take 10 mg by mouth Every Morning., Disp: , Rfl:   •  triamcinolone (KENALOG) 0.025 % cream, Apply  topically 2 (Two) Times a Day., Disp: , Rfl:   •  vitamin D (ERGOCALCIFEROL) 40273 units capsule capsule, Take 50,000 Units by mouth 1 (One) Time Per Week.,  "Disp: , Rfl:     Objective   Resp 18   Ht 157.5 cm (62\")   Wt 88 kg (194 lb)   BMI 35.48 kg/m²    Physical Exam  Constitutional: Patient is oriented to person, place, and time. Patient appears well-developed and well-nourished.   HENT:Head: Normocephalic and atraumatic.   Eyes: EOM are normal. Pupils are equal, round, and reactive to light.   Neck: Normal range of motion. Neck supple.   Cardiovascular: Normal rate.    Pulmonary/Chest: Effort normal and breath sounds normal.   Abdominal: Soft.   Neurological: Patient is alert and oriented to person, place, and time.   Skin: Skin is warm and dry.   Psychiatric: Patient has a normal mood and affect.   Nursing note and vitals reviewed.       Ortho Exam       Assessment/Plan   Review of Radiographic Studies:    No new imaging done today.      Large Joint Arthrocentesis  Date/Time: 8/14/2018 1:06 PM  Consent given by: patient  Site marked: site marked  Timeout: Immediately prior to procedure a time out was called to verify the correct patient, procedure, equipment, support staff and site/side marked as required   Supporting Documentation  Indications: pain   Procedure Details  Location: knee - R knee  Preparation: Patient was prepped and draped in the usual sterile fashion  Needle size: 22 G  Medications administered: 25 mg sodium hyaluronate 25 MG/2.5ML  Patient tolerance: patient tolerated the procedure well with no immediate complications           Leti was seen today for follow-up, pain and injections.    Diagnoses and all orders for this visit:    Arthritis of knee  -     Large Joint Arthrocentesis       Orthopedic activities reviewed and patient expressed appreciation      Recommendations/Plan:   Work/Activity Status: May perform usual activities as tolerated    Patient agreeable to call or return sooner for any concerns.             Impression:  Right knee osteoarthritis  Plan:  Return for injection next week  "

## 2018-08-21 ENCOUNTER — OFFICE VISIT (OUTPATIENT)
Dept: ORTHOPEDIC SURGERY | Facility: CLINIC | Age: 73
End: 2018-08-21

## 2018-08-21 VITALS — RESPIRATION RATE: 18 BRPM | BODY MASS INDEX: 35.7 KG/M2 | HEIGHT: 62 IN | WEIGHT: 194 LBS

## 2018-08-21 DIAGNOSIS — M17.10 ARTHRITIS OF KNEE: ICD-10-CM

## 2018-08-21 DIAGNOSIS — M94.261 CHONDROMALACIA OF RIGHT KNEE: Primary | ICD-10-CM

## 2018-08-21 PROCEDURE — 20610 DRAIN/INJ JOINT/BURSA W/O US: CPT | Performed by: ORTHOPAEDIC SURGERY

## 2018-08-21 NOTE — PROGRESS NOTES
Subjective   Patient ID: Leti Castañeda is a 73 y.o. female  Follow-up of the Right Knee (Patient is here today for her 3rd supartz inejcetion.)             History of Present Illness    No adverse reactions to prior injection she would like to receive the third in her series    Review of Systems   Constitutional: Negative for fever.   HENT: Negative for voice change.    Eyes: Negative for visual disturbance.   Respiratory: Negative for shortness of breath.    Cardiovascular: Negative for chest pain.   Gastrointestinal: Negative for abdominal distention and abdominal pain.   Genitourinary: Negative for dysuria.   Musculoskeletal: Positive for arthralgias. Negative for gait problem and joint swelling.   Skin: Negative for rash.   Neurological: Negative for speech difficulty.   Hematological: Does not bruise/bleed easily.   Psychiatric/Behavioral: Negative for confusion.       Past Medical History:   Diagnosis Date   • Arthritis    • Cancer (CMS/HCC)     HX OF COLON    • Coronary artery disease    • Depression    • GERD (gastroesophageal reflux disease)    • Histoplasmosis    • History of transfusion    • Hypertension    • IBS (irritable bowel syndrome)    • Osteoporosis    • Pancreatitis     WAS TOLD [place]. POSSIBLE HEP . B IN 2010 THIS WAS AT A URGENT C[place]   • Right wrist sprain         Past Surgical History:   Procedure Laterality Date   • ABDOMINAL AORTA STENT      PATIENT STATES DOES NOT HAVE    • CARDIAC CATHETERIZATION     • CHOLECYSTECTOMY     • COLON SURGERY     • CORONARY ANGIOPLASTY WITH STENT PLACEMENT  2001   • KNEE ARTHROSCOPY Right 5/30/2018    Procedure: Diagnostic arthroscopy right knee partial lateral meniscectomy;  Surgeon: Luciano Terry MD;  Location: [facility];  Service: Orthopedics   • OTHER SURGICAL HISTORY      PORTACATH REMOVAL   • PORTACATH PLACEMENT         Family History   Problem Relation Age of Onset   • Hypertension Other    • Diabetes Other        Social History     Social  "History   • Marital status:      Spouse name: N/A   • Number of children: N/A   • Years of education: N/A     Occupational History   • Not on file.     Social History Main Topics   • Smoking status: Never Smoker   • Smokeless tobacco: Never Used   • Alcohol use No   • Drug use: No   • Sexual activity: Defer     Other Topics Concern   • Not on file     Social History Narrative   • No narrative on file       I have reviewed all of the above social hx, family hx, surgical hx, medications, allergies & ROS and confirm that it is accurate.    Allergies   Allergen Reactions   • Codeine GI Intolerance         Current Outpatient Prescriptions:   •  alendronate (FOSAMAX) 70 MG tablet, Take 70 mg by mouth Every 7 (Seven) Days., Disp: , Rfl:   •  amLODIPine (NORVASC) 5 MG tablet, Take 5 mg by mouth Daily., Disp: , Rfl:   •  aspirin 81 MG chewable tablet, Chew 81 mg Daily., Disp: , Rfl:   •  chlorthalidone (HYGROTON) 25 MG tablet, Take 25 mg by mouth Daily., Disp: , Rfl:   •  HYDROcodone-acetaminophen (NORCO) 5-325 MG per tablet, Take 1-2 tablets by mouth Every 6 (Six) Hours As Needed for pain, Disp: 20 tablet, Rfl: 0  •  irbesartan (AVAPRO) 300 MG tablet, Take 300 mg by mouth Every Night., Disp: , Rfl:   •  levocetirizine (XYZAL) 5 MG tablet, Take 5 mg by mouth Every Evening., Disp: , Rfl:   •  NON FORMULARY, Take 1 capsule by mouth Daily. MEGHA RED, Disp: , Rfl:   •  Omega-3 Fatty Acids (FISH OIL) 1000 MG capsule capsule, Take 1,000 mg by mouth Every Night., Disp: , Rfl:   •  omeprazole (priLOSEC) 20 MG capsule, Take 20 mg by mouth Daily., Disp: , Rfl:   •  PARoxetine (PAXIL) 10 MG tablet, Take 10 mg by mouth Every Morning., Disp: , Rfl:   •  triamcinolone (KENALOG) 0.025 % cream, Apply  topically 2 (Two) Times a Day., Disp: , Rfl:   •  vitamin D (ERGOCALCIFEROL) 79939 units capsule capsule, Take 50,000 Units by mouth 1 (One) Time Per Week., Disp: , Rfl:     Objective   Resp 18   Ht 157.5 cm (62\")   Wt 88 kg (194 lb)  "  BMI 35.48 kg/m²    Physical Exam  Constitutional: Patient is oriented to person, place, and time. Patient appears well-developed and well-nourished.   HENT:Head: Normocephalic and atraumatic.   Eyes: EOM are normal. Pupils are equal, round, and reactive to light.   Neck: Normal range of motion. Neck supple.   Cardiovascular: Normal rate.    Pulmonary/Chest: Effort normal and breath sounds normal.   Abdominal: Soft.   Neurological: Patient is alert and oriented to person, place, and time.   Skin: Skin is warm and dry.   Psychiatric: Patient has a normal mood and affect.   Nursing note and vitals reviewed.       Ortho Exam   Right knee with no warmth very slight effusion crepitus with range of motion consistent with osteoarthritis, calf supple no edema in the ankle    Assessment/Plan   Review of Radiographic Studies:    No new imaging done today.      Large Joint Arthrocentesis  Date/Time: 8/21/2018 10:29 AM  Consent given by: patient  Site marked: site marked  Timeout: Immediately prior to procedure a time out was called to verify the correct patient, procedure, equipment, support staff and site/side marked as required   Supporting Documentation  Indications: pain   Procedure Details  Location: knee - R knee  Preparation: Patient was prepped and draped in the usual sterile fashion  Needle size: 22 G  Medications administered: 25 mg sodium hyaluronate 25 MG/2.5ML  Patient tolerance: patient tolerated the procedure well with no immediate complications           Leti was seen today for follow-up.    Diagnoses and all orders for this visit:    Chondromalacia of right knee  -     Large Joint Arthrocentesis    Arthritis of knee       Orthopedic activities reviewed and patient expressed appreciation      Recommendations/Plan:   Work/Activity Status: May perform usual activities as tolerated    Patient agreeable to call or return sooner for any concerns.             Impression:  Right knee osteoarthritis  Plan:  Return  next week for #4 of her 5 series injections

## 2018-08-28 ENCOUNTER — OFFICE VISIT (OUTPATIENT)
Dept: ORTHOPEDIC SURGERY | Facility: CLINIC | Age: 73
End: 2018-08-28

## 2018-08-28 VITALS — WEIGHT: 194 LBS | HEIGHT: 62 IN | BODY MASS INDEX: 35.7 KG/M2 | RESPIRATION RATE: 18 BRPM

## 2018-08-28 DIAGNOSIS — M94.261 CHONDROMALACIA OF RIGHT KNEE: Primary | ICD-10-CM

## 2018-08-28 DIAGNOSIS — M17.10 ARTHRITIS OF KNEE: ICD-10-CM

## 2018-08-28 PROCEDURE — 20610 DRAIN/INJ JOINT/BURSA W/O US: CPT | Performed by: ORTHOPAEDIC SURGERY

## 2018-08-28 NOTE — PROGRESS NOTES
Subjective   Patient ID: Leti Castañeda is a 73 y.o. female  Follow-up and Pain of the Right Knee (Patient is here today for her 4th supartz injection.)             History of Present Illness    She returns for her fourth injection to the right knee, experiencing no adverse reactions to last week's injection    Review of Systems   Constitutional: Negative for fever.   HENT: Negative for voice change.    Eyes: Negative for visual disturbance.   Respiratory: Negative for shortness of breath.    Cardiovascular: Negative for chest pain.   Gastrointestinal: Negative for abdominal distention and abdominal pain.   Genitourinary: Negative for dysuria.   Musculoskeletal: Positive for arthralgias. Negative for gait problem and joint swelling.   Skin: Negative for rash.   Neurological: Negative for speech difficulty.   Hematological: Does not bruise/bleed easily.   Psychiatric/Behavioral: Negative for confusion.       Past Medical History:   Diagnosis Date   • Arthritis    • Cancer (CMS/HCC)     HX OF COLON    • Coronary artery disease    • Depression    • GERD (gastroesophageal reflux disease)    • Histoplasmosis    • History of transfusion    • Hypertension    • IBS (irritable bowel syndrome)    • Osteoporosis    • Pancreatitis     [place]OLD [place] POSSIBLE HEP . B IN 2010 THIS WAS AT A [place]RGENT [place]   • Right wrist sprain         Past Surgical History:   Procedure Laterality Date   • ABDOMINAL AORTA STENT      PATIENT STATES DOES NOT HAVE    • CARDIAC CATHETERIZATION     • CHOLECYSTECTOMY     • COLON SURGERY     • CORONARY ANGIOPLASTY WITH STENT PLACEMENT  2001   • KNEE ARTHROSCOPY Right 5/30/2018    Procedure: Diagnostic arthroscopy right knee partial lateral meniscectomy;  Surgeon: Luciano Terry MD;  Location: [facility];  Service: Orthopedics   • OTHER SURGICAL HISTORY      PORTACATH REMOVAL   • PORTACATH PLACEMENT         Family History   Problem Relation Age of Onset   • Hypertension Other    • Diabetes Other         Social History     Social History   • Marital status:      Spouse name: N/A   • Number of children: N/A   • Years of education: N/A     Occupational History   • Not on file.     Social History Main Topics   • Smoking status: Never Smoker   • Smokeless tobacco: Never Used   • Alcohol use No   • Drug use: No   • Sexual activity: Defer     Other Topics Concern   • Not on file     Social History Narrative   • No narrative on file       I have reviewed all of the above social hx, family hx, surgical hx, medications, allergies & ROS and confirm that it is accurate.    Allergies   Allergen Reactions   • Codeine GI Intolerance         Current Outpatient Prescriptions:   •  alendronate (FOSAMAX) 70 MG tablet, Take 70 mg by mouth Every 7 (Seven) Days., Disp: , Rfl:   •  amLODIPine (NORVASC) 5 MG tablet, Take 5 mg by mouth Daily., Disp: , Rfl:   •  aspirin 81 MG chewable tablet, Chew 81 mg Daily., Disp: , Rfl:   •  chlorthalidone (HYGROTON) 25 MG tablet, Take 25 mg by mouth Daily., Disp: , Rfl:   •  HYDROcodone-acetaminophen (NORCO) 5-325 MG per tablet, Take 1-2 tablets by mouth Every 6 (Six) Hours As Needed for pain, Disp: 20 tablet, Rfl: 0  •  irbesartan (AVAPRO) 300 MG tablet, Take 300 mg by mouth Every Night., Disp: , Rfl:   •  levocetirizine (XYZAL) 5 MG tablet, Take 5 mg by mouth Every Evening., Disp: , Rfl:   •  NON FORMULARY, Take 1 capsule by mouth Daily. MEGHA RED, Disp: , Rfl:   •  Omega-3 Fatty Acids (FISH OIL) 1000 MG capsule capsule, Take 1,000 mg by mouth Every Night., Disp: , Rfl:   •  omeprazole (priLOSEC) 20 MG capsule, Take 20 mg by mouth Daily., Disp: , Rfl:   •  PARoxetine (PAXIL) 10 MG tablet, Take 10 mg by mouth Every Morning., Disp: , Rfl:   •  triamcinolone (KENALOG) 0.025 % cream, Apply  topically 2 (Two) Times a Day., Disp: , Rfl:   •  vitamin D (ERGOCALCIFEROL) 79759 units capsule capsule, Take 50,000 Units by mouth 1 (One) Time Per Week., Disp: , Rfl:     Objective   Resp 18   Ht 157.5  "cm (62\")   Wt 88 kg (194 lb)   BMI 35.48 kg/m²    Physical Exam  Constitutional: Patient is oriented to person, place, and time. Patient appears well-developed and well-nourished.   HENT:Head: Normocephalic and atraumatic.   Eyes: EOM are normal. Pupils are equal, round, and reactive to light.   Neck: Normal range of motion. Neck supple.   Cardiovascular: Normal rate.    Pulmonary/Chest: Effort normal and breath sounds normal.   Abdominal: Soft.   Neurological: Patient is alert and oriented to person, place, and time.   Skin: Skin is warm and dry.   Psychiatric: Patient has a normal mood and affect.   Nursing note and vitals reviewed.       Ortho Exam     Right knee consistent with osteoarthritis pain with range of motion minimal effusion slight warmth no erythema neurovascularly intact  Assessment/Plan   Review of Radiographic Studies:    No new imaging done today.      Large Joint Arthrocentesis  Date/Time: 8/28/2018 1:04 PM  Consent given by: patient  Site marked: site marked  Timeout: Immediately prior to procedure a time out was called to verify the correct patient, procedure, equipment, support staff and site/side marked as required   Supporting Documentation  Indications: pain   Procedure Details  Location: knee - R knee  Preparation: Patient was prepped and draped in the usual sterile fashion  Needle size: 22 G  Medications administered: 25 mg sodium hyaluronate 25 MG/2.5ML  Patient tolerance: patient tolerated the procedure well with no immediate complications           Leti was seen today for follow-up and pain.    Diagnoses and all orders for this visit:    Chondromalacia of right knee  -     Large Joint Arthrocentesis    Arthritis of knee       Orthopedic activities reviewed and patient expressed appreciation      Recommendations/Plan:   Work/Activity Status: May perform usual activities as tolerated    Patient agreeable to call or return sooner for any concerns.             Impression:  Right knee " osteoarthritis  Plan:  Return next week for final injection

## 2018-09-04 ENCOUNTER — OFFICE VISIT (OUTPATIENT)
Dept: ORTHOPEDIC SURGERY | Facility: CLINIC | Age: 73
End: 2018-09-04

## 2018-09-04 VITALS — BODY MASS INDEX: 35.7 KG/M2 | HEIGHT: 62 IN | WEIGHT: 194 LBS | RESPIRATION RATE: 18 BRPM

## 2018-09-04 DIAGNOSIS — M17.10 ARTHRITIS OF KNEE: ICD-10-CM

## 2018-09-04 DIAGNOSIS — M94.261 CHONDROMALACIA OF RIGHT KNEE: Primary | ICD-10-CM

## 2018-09-04 PROCEDURE — 20610 DRAIN/INJ JOINT/BURSA W/O US: CPT | Performed by: ORTHOPAEDIC SURGERY

## 2018-09-04 NOTE — PROGRESS NOTES
Subjective   Patient ID: Leti Castañeda is a 73 y.o. female  Follow-up and Pain of the Right Knee (Patient is here today for her last supartz injection, she says her pain is getting better.)             History of Present Illness    Patient returns for her last Supartz injection she had no adverse reactions to last week's injection    Review of Systems   Constitutional: Negative for fever.   HENT: Negative for voice change.    Eyes: Negative for visual disturbance.   Respiratory: Negative for shortness of breath.    Cardiovascular: Negative for chest pain.   Gastrointestinal: Negative for abdominal distention and abdominal pain.   Genitourinary: Negative for dysuria.   Musculoskeletal: Positive for arthralgias. Negative for gait problem and joint swelling.   Skin: Negative for rash.   Neurological: Negative for speech difficulty.   Hematological: Does not bruise/bleed easily.   Psychiatric/Behavioral: Negative for confusion.       Past Medical History:   Diagnosis Date   • Arthritis    • Cancer (CMS/HCC)     HX OF COLON    • Coronary artery disease    • Depression    • GERD (gastroesophageal reflux disease)    • Histoplasmosis    • History of transfusion    • Hypertension    • IBS (irritable bowel syndrome)    • Osteoporosis    • Pancreatitis     [place]OLD [place] POSSIBLE HEP . B IN 2010 THIS WAS AT A [place]RGENT [place]   • Right wrist sprain         Past Surgical History:   Procedure Laterality Date   • ABDOMINAL AORTA STENT      PATIENT STATES DOES NOT HAVE    • CARDIAC CATHETERIZATION     • CHOLECYSTECTOMY     • COLON SURGERY     • CORONARY ANGIOPLASTY WITH STENT PLACEMENT  2001   • KNEE ARTHROSCOPY Right 5/30/2018    Procedure: Diagnostic arthroscopy right knee partial lateral meniscectomy;  Surgeon: Luciano Terry MD;  Location: [facility];  Service: Orthopedics   • OTHER SURGICAL HISTORY      PORTACATH REMOVAL   • PORTACATH PLACEMENT         Family History   Problem Relation Age of Onset   • Hypertension Other     • Diabetes Other        Social History     Social History   • Marital status:      Spouse name: N/A   • Number of children: N/A   • Years of education: N/A     Occupational History   • Not on file.     Social History Main Topics   • Smoking status: Never Smoker   • Smokeless tobacco: Never Used   • Alcohol use No   • Drug use: No   • Sexual activity: Defer     Other Topics Concern   • Not on file     Social History Narrative   • No narrative on file       I have reviewed all of the above social hx, family hx, surgical hx, medications, allergies & ROS and confirm that it is accurate.    Allergies   Allergen Reactions   • Codeine GI Intolerance         Current Outpatient Prescriptions:   •  alendronate (FOSAMAX) 70 MG tablet, Take 70 mg by mouth Every 7 (Seven) Days., Disp: , Rfl:   •  amLODIPine (NORVASC) 5 MG tablet, Take 5 mg by mouth Daily., Disp: , Rfl:   •  aspirin 81 MG chewable tablet, Chew 81 mg Daily., Disp: , Rfl:   •  chlorthalidone (HYGROTON) 25 MG tablet, Take 25 mg by mouth Daily., Disp: , Rfl:   •  HYDROcodone-acetaminophen (NORCO) 5-325 MG per tablet, Take 1-2 tablets by mouth Every 6 (Six) Hours As Needed for pain, Disp: 20 tablet, Rfl: 0  •  irbesartan (AVAPRO) 300 MG tablet, Take 300 mg by mouth Every Night., Disp: , Rfl:   •  levocetirizine (XYZAL) 5 MG tablet, Take 5 mg by mouth Every Evening., Disp: , Rfl:   •  NON FORMULARY, Take 1 capsule by mouth Daily. MEGHA RED, Disp: , Rfl:   •  Omega-3 Fatty Acids (FISH OIL) 1000 MG capsule capsule, Take 1,000 mg by mouth Every Night., Disp: , Rfl:   •  omeprazole (priLOSEC) 20 MG capsule, Take 20 mg by mouth Daily., Disp: , Rfl:   •  PARoxetine (PAXIL) 10 MG tablet, Take 10 mg by mouth Every Morning., Disp: , Rfl:   •  triamcinolone (KENALOG) 0.025 % cream, Apply  topically 2 (Two) Times a Day., Disp: , Rfl:   •  vitamin D (ERGOCALCIFEROL) 94054 units capsule capsule, Take 50,000 Units by mouth 1 (One) Time Per Week., Disp: , Rfl:     Objective  "  Resp 18   Ht 157.5 cm (62\")   Wt 88 kg (194 lb)   BMI 35.48 kg/m²    Physical Exam  Constitutional: Patient is oriented to person, place, and time. Patient appears well-developed and well-nourished.   HENT:Head: Normocephalic and atraumatic.   Eyes: EOM are normal. Pupils are equal, round, and reactive to light.   Neck: Normal range of motion. Neck supple.   Cardiovascular: Normal rate.    Pulmonary/Chest: Effort normal and breath sounds normal.   Abdominal: Soft.   Neurological: Patient is alert and oriented to person, place, and time.   Skin: Skin is warm and dry.   Psychiatric: Patient has a normal mood and affect.   Nursing note and vitals reviewed.       Ortho Exam   Right knee exam consistent with osteoarthritis minimal ecchymosis no erythema full extension positive crepitus with range of motion neurovascularly intact    Assessment/Plan   Review of Radiographic Studies:    No new imaging done today.      Large Joint Arthrocentesis  Date/Time: 9/4/2018 1:04 PM  Consent given by: patient  Site marked: site marked  Timeout: Immediately prior to procedure a time out was called to verify the correct patient, procedure, equipment, support staff and site/side marked as required   Supporting Documentation  Indications: pain   Procedure Details  Location: knee - R knee  Preparation: Patient was prepped and draped in the usual sterile fashion  Needle size: 22 G  Medications administered: 25 mg sodium hyaluronate 25 MG/2.5ML  Patient tolerance: patient tolerated the procedure well with no immediate complications           Leti was seen today for follow-up and pain.    Diagnoses and all orders for this visit:    Chondromalacia of right knee  -     Large Joint Arthrocentesis    Arthritis of knee       Orthopedic activities reviewed and patient expressed appreciation      Recommendations/Plan:   Exercise, medications, injections, other patient advice, and return appointment as noted.    Patient agreeable to call or " return sooner for any concerns.             Impression:  Right knee osteoarthritis  Plan:  Return when necessary for repeat injections

## 2019-10-14 PROBLEM — I25.10 CAD (CORONARY ARTERY DISEASE): Status: ACTIVE | Noted: 2019-10-14

## 2019-10-14 PROBLEM — K74.60 CIRRHOSIS, NON-ALCOHOLIC (HCC): Status: ACTIVE | Noted: 2019-10-14

## 2019-10-14 PROBLEM — K21.9 GERD (GASTROESOPHAGEAL REFLUX DISEASE): Status: ACTIVE | Noted: 2019-10-14

## 2019-10-14 PROBLEM — E78.5 DYSLIPIDEMIA: Status: ACTIVE | Noted: 2019-10-14

## 2019-10-14 PROBLEM — I10 ESSENTIAL HYPERTENSION: Status: ACTIVE | Noted: 2019-10-14

## 2019-10-14 NOTE — PROGRESS NOTES
Subjective   Leti Castañeda is a 74 y.o. female.  Primary Care: Austyn Tee MD  Referring: Lupis Jade MD  [address] [id number]      Chief Complaint   Patient presents with   • Chest Pain   • Shortness of Breath   • Edema     Patient Active Problem List    Diagnosis    • CAD (coronary artery disease)      1. Remote stent 2001  2. NSTEMI / LHC 8-31-19: [facility] with OSITO to mid LAD, EF 45%.   • Essential hypertension    • Dyslipidemia    • Statin intolerance      Elevated liver enzymes   • Chronic pancreatitis (CMS/HCC)    • GERD (gastroesophageal reflux disease)    • Cirrhosis, non-alcoholic (CMS/HCC)       History of Present Illness   This is a 74-year-old hypertensive dyslipidemic female with remote history of stenting to an unknown vessel approximately 18 years ago.  She recently presented to [facility] emergency department with chest pain shortness of breath and nausea.  She ruled in for non-ST elevation MI and was transferred to [facility] where she underwent stenting of the mid LAD.  She was discharged home on the above medications and followed up with primary care who was referred to our service to establish primary cardiology follow-up.  She continues to have intermittent random episodes of chest discomfort that are mild and last no more than 2 to 3 minutes.  She also complains of shortness of breath at rest which sometimes awakens her from sleep however she feels that it is often better while lying supine.  Denies orthopnea or lower extremity edema.  She does not check her blood pressure at home.  She is not currently on statin therapy.  She has a history of statin intolerance due to elevated liver enzymes.  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  She states compliance with her current medical regimen.  She reports no significant side effects.    Summary of records  reviewed:  · Primary care note dated September 10, 2019 was reviewed at which time she presented for follow-up after hospitalization for non-ST elevation MI.  She reported doing well with the exception of some residual soreness in the right upper extremity.  Was noted that amlodipine had been discontinued during her hospitalization and replaced with lisinopril.  This was discontinued in favor of losartan.  Her exam that day was unremarkable.  Her medications were refilled.  Blood pressure was noted to be elevated.  Her current medications that day do not include a statin however prior authorization Repatha is mentioned later in the note.  · Hospital records from [facility] with an admitting date of August 30, 2019 and discharge date of September 1, 2019 were reviewed.  She was noted to have presented to [facility] emergency department with chest pain, ruled in for non-ST elevation MI and was transferred to [facility] for higher level of care.  There she underwent cardiac catheterization with stenting of the mid LAD.  An echocardiogram shows an ejection fraction of 45% ±5% and normal valvular morphology.    Past Surgical History:   Procedure Laterality Date   • ABDOMINAL AORTA STENT      PATIENT STATES DOES NOT HAVE    • CARDIAC CATHETERIZATION     • CHOLECYSTECTOMY     • COLON SURGERY     • CORONARY ANGIOPLASTY WITH STENT PLACEMENT  2001   • KNEE ARTHROSCOPY Right 5/30/2018    Procedure: Diagnostic arthroscopy right knee partial lateral meniscectomy;  Surgeon: Luciano Terry MD;  Location: [facility];  Service: Orthopedics   • OTHER SURGICAL HISTORY      PORTACATH REMOVAL   • PORTACATH PLACEMENT         The following portions of the patient's history were reviewed and updated as appropriate: allergies, current medications, past family history, past medical history, past social history, past surgical history and problem list.    Allergies   Allergen Reactions   • Codeine GI Intolerance          Current Outpatient Medications:   •  aspirin 81 MG chewable tablet, Chew 81 mg Daily., Disp: , Rfl:   •  levocetirizine (XYZAL) 5 MG tablet, Take 5 mg by mouth Every Evening., Disp: , Rfl:   •  metoprolol tartrate (LOPRESSOR) 25 MG tablet, Take 12.5 mg by mouth 2 (Two) Times a Day., Disp: , Rfl:   •  montelukast (SINGULAIR) 10 MG tablet, Take 10 mg by mouth Every Night., Disp: , Rfl:   •  Omega-3 Fatty Acids (FISH OIL) 1000 MG capsule capsule, Take 1,000 mg by mouth Every Night., Disp: , Rfl:   •  omeprazole (priLOSEC) 20 MG capsule, Take 20 mg by mouth Daily., Disp: , Rfl:   •  PARoxetine (PAXIL) 10 MG tablet, Take 10 mg by mouth Every Morning., Disp: , Rfl:   •  ticagrelor (BRILINTA) 90 MG tablet tablet, Take 90 mg by mouth 2 (Two) Times a Day., Disp: , Rfl:   •  valsartan (DIOVAN) 80 MG tablet, Take 80 mg by mouth Daily., Disp: , Rfl:   •  vitamin B-12 (CYANOCOBALAMIN) 1000 MCG tablet, Take 1,000 mcg by mouth Daily., Disp: , Rfl:   •  vitamin D (ERGOCALCIFEROL) 65936 units capsule capsule, Take 50,000 Units by mouth 1 (One) Time Per Week., Disp: , Rfl:         Review of Systems   Constitution: Positive for fever, weakness and malaise/fatigue.   HENT: Positive for hearing loss and tinnitus.    Eyes: Positive for blurred vision and visual halos.   Cardiovascular: Positive for chest pain.   Respiratory: Positive for cough, shortness of breath and wheezing.    Endocrine: Negative.    Hematologic/Lymphatic: Negative.    Skin: Negative.    Musculoskeletal: Positive for arthritis.   Gastrointestinal: Positive for abdominal pain, change in bowel habit and nausea.   Genitourinary: Negative.    Psychiatric/Behavioral: Negative.    Allergic/Immunologic: Negative.    All other systems reviewed and are negative.      Social History     Socioeconomic History   • Marital status:      Spouse name: Not on file   • Number of children: Not on file   • Years of education: Not on file   • Highest education level: Not  "on file   Tobacco Use   • Smoking status: Never Smoker   • Smokeless tobacco: Never Used   Substance and Sexual Activity   • Alcohol use: No   • Drug use: No   • Sexual activity: Defer       Family History   Problem Relation Age of Onset   • Hypertension Other    • Diabetes Other    • Stroke Mother    • Cancer Father        Objective      BP (!) 182/86 (BP Location: Left arm, Patient Position: Sitting)   Pulse 54   Ht 157.5 cm (62\")   Wt 83.9 kg (185 lb)   BMI 33.84 kg/m²   Recheck by Dr Adler 176/80  Physical Exam   Constitutional: She is oriented to person, place, and time. She appears well-developed and well-nourished. No distress.   HENT:   Head: Normocephalic and atraumatic.   Mouth/Throat: Oropharynx is clear and moist.   Eyes: Pupils are equal, round, and reactive to light. No scleral icterus.   Neck: Neck supple. No JVD present. No tracheal deviation present. No thyromegaly present.   Cardiovascular: Normal rate, regular rhythm and normal heart sounds. Exam reveals no gallop and no friction rub.   No murmur heard.  Pulmonary/Chest: Effort normal and breath sounds normal. No respiratory distress. She has no wheezes. She has no rales.   Abdominal: Soft. Bowel sounds are normal. She exhibits no distension and no mass. There is no tenderness. There is no rebound and no guarding.   Musculoskeletal: Normal range of motion. She exhibits no edema or deformity.   Lymphadenopathy:     She has no cervical adenopathy.   Neurological: She is alert and oriented to person, place, and time. No cranial nerve deficit.   Skin: Skin is warm and dry. No rash noted. She is not diaphoretic.   Psychiatric: She has a normal mood and affect.   Nursing note and vitals reviewed.        ECG 12 Lead  Date/Time: 10/14/2019 4:44 PM  Performed by: Richa Adler MD  Authorized by: Richa Adler MD   Previous ECG: no previous ECG available  Rhythm: sinus bradycardia  Rate: normal  BPM: 54  Conduction: conduction normal  ST Segments: ST " segments normal  T Waves: T waves normal  T inversion: all  QRS axis: normal  Other: no other findings    Clinical impression: abnormal EKG            Lab Review:     Fasting lipid panel from [facility] dated 8/31/2019:  Total cholesterol 167, triglycerides 121, HDL 30, .8          Assessment:   Diagnosis Plan   1. Coronary artery disease involving native coronary artery of native heart without angina pectoris   stable, continue current treatment   2. Essential hypertension  Discontinue Diovan 80 mg daily and replace with Diovan -12.5 mg daily.  Of also asked her to check her blood pressure daily midday for the next 2 weeks and to call our office with the results for possible further titration.   3. Dyslipidemia   her primary care has already initiated prior authorization for Repatha with which we agree.      Plan:  Assessment and Recommendations as above.   Change diovan to diovan.hctz 160/12.5 daily, Continue rest of the current medications.   Monitor BP at home, goal <135/80  FU in 3 months, sooner as needed.  Thank you for allowing us to participate in the care of your patient.     Scribed for Richa Adler MD by Electronically signed by Electronically signed by BOBY Garcia, 10/15/19, 11:15 AM.    I, Richa Adler MD, personally performed the services described in this documentation as scribed by the above named individual in my presence, and it is both accurate and complete.  10/15/2019  11:36 AM

## 2019-10-15 ENCOUNTER — CONSULT (OUTPATIENT)
Dept: CARDIOLOGY | Facility: CLINIC | Age: 74
End: 2019-10-15

## 2019-10-15 VITALS
BODY MASS INDEX: 34.04 KG/M2 | WEIGHT: 185 LBS | HEART RATE: 54 BPM | DIASTOLIC BLOOD PRESSURE: 86 MMHG | HEIGHT: 62 IN | SYSTOLIC BLOOD PRESSURE: 182 MMHG

## 2019-10-15 DIAGNOSIS — I25.10 CORONARY ARTERY DISEASE INVOLVING NATIVE CORONARY ARTERY OF NATIVE HEART WITHOUT ANGINA PECTORIS: Primary | ICD-10-CM

## 2019-10-15 DIAGNOSIS — I10 ESSENTIAL HYPERTENSION: ICD-10-CM

## 2019-10-15 DIAGNOSIS — E78.5 DYSLIPIDEMIA: ICD-10-CM

## 2019-10-15 PROBLEM — K86.1 CHRONIC PANCREATITIS (HCC): Status: ACTIVE | Noted: 2019-10-15

## 2019-10-15 PROBLEM — Z78.9 STATIN INTOLERANCE: Status: ACTIVE | Noted: 2019-10-15

## 2019-10-15 PROCEDURE — 99204 OFFICE O/P NEW MOD 45 MIN: CPT | Performed by: INTERNAL MEDICINE

## 2019-10-15 PROCEDURE — 93000 ELECTROCARDIOGRAM COMPLETE: CPT | Performed by: INTERNAL MEDICINE

## 2019-10-15 RX ORDER — VALSARTAN AND HYDROCHLOROTHIAZIDE 160; 12.5 MG/1; MG/1
1 TABLET, FILM COATED ORAL DAILY
Qty: 30 TABLET | Refills: 5 | Status: SHIPPED | OUTPATIENT
Start: 2019-10-15 | End: 2019-10-18 | Stop reason: SDUPTHER

## 2019-10-15 RX ORDER — LANOLIN ALCOHOL/MO/W.PET/CERES
1000 CREAM (GRAM) TOPICAL DAILY
COMMUNITY

## 2019-10-15 RX ORDER — MONTELUKAST SODIUM 10 MG/1
10 TABLET ORAL NIGHTLY
COMMUNITY

## 2019-10-15 RX ORDER — VALSARTAN 80 MG/1
80 TABLET ORAL DAILY
COMMUNITY
End: 2019-10-15

## 2019-10-18 ENCOUNTER — TELEPHONE (OUTPATIENT)
Dept: CARDIOLOGY | Facility: CLINIC | Age: 74
End: 2019-10-18

## 2019-10-18 RX ORDER — VALSARTAN AND HYDROCHLOROTHIAZIDE 160; 12.5 MG/1; MG/1
1 TABLET, FILM COATED ORAL 2 TIMES DAILY
Qty: 60 TABLET | Refills: 0
Start: 2019-10-18 | End: 2020-02-04

## 2019-10-18 NOTE — TELEPHONE ENCOUNTER
Patient reports elevated BP over the last three days since medication increase.  On 10/15/2019 her diovan was increased to 160mg daily, and hctz 12.5mg daily was added as a combination tablet.    Since starting the new dose her BP continues to be elevated.  Reports readings in the 180-216 SBP.  Current /90.  States she has been dizzy today.      Per Dr. Adler - increase diovan/hctz to one tablet BID.  Call back in 4-5 days with results.  Sooner if necessary.    Patient verbalized understanding.

## 2019-10-24 ENCOUNTER — TELEPHONE (OUTPATIENT)
Dept: CARDIOLOGY | Facility: CLINIC | Age: 74
End: 2019-10-24

## 2019-10-24 NOTE — TELEPHONE ENCOUNTER
Family reports the patient was seen by Dr. Tee today for elevated BP.  Reading today at PCP office was 181/90.   Readings have continued to be elevated since Dr. Adler increased diovan dose.  He added amlodipine 10mg daily and ordered a stress test.  Patient has also had some dizziness/CP.  She was started in the ER     They will continue to monitor and call back with results.

## 2019-11-07 ENCOUNTER — TELEPHONE (OUTPATIENT)
Dept: CARDIOLOGY | Facility: CLINIC | Age: 74
End: 2019-11-07

## 2019-11-07 ENCOUNTER — OUTSIDE FACILITY SERVICE (OUTPATIENT)
Dept: CARDIOLOGY | Facility: CLINIC | Age: 74
End: 2019-11-07

## 2019-11-07 PROCEDURE — 78452 HT MUSCLE IMAGE SPECT MULT: CPT | Performed by: INTERNAL MEDICINE

## 2019-11-07 PROCEDURE — 93018 CV STRESS TEST I&R ONLY: CPT | Performed by: INTERNAL MEDICINE

## 2019-11-07 NOTE — TELEPHONE ENCOUNTER
Patients daughter called to report since starting her amlodipine, her BP has improved. She will call if she needs anything further.

## 2019-12-19 ENCOUNTER — APPOINTMENT (OUTPATIENT)
Dept: CT IMAGING | Facility: HOSPITAL | Age: 74
End: 2019-12-19

## 2019-12-19 ENCOUNTER — HOSPITAL ENCOUNTER (INPATIENT)
Facility: HOSPITAL | Age: 74
LOS: 1 days | Discharge: HOME | End: 2019-12-21
Attending: HOSPITALIST | Admitting: HOSPITALIST

## 2019-12-19 DIAGNOSIS — I63.9 CEREBROVASCULAR ACCIDENT (CVA), UNSPECIFIED MECHANISM (HCC): ICD-10-CM

## 2019-12-19 DIAGNOSIS — Z74.09 IMPAIRED MOBILITY AND ADLS: Primary | ICD-10-CM

## 2019-12-19 DIAGNOSIS — Z78.9 IMPAIRED MOBILITY AND ADLS: Primary | ICD-10-CM

## 2019-12-19 PROBLEM — R29.898 UPPER EXTREMITY WEAKNESS: Status: ACTIVE | Noted: 2019-12-19

## 2019-12-19 LAB — GLUCOSE BLDC GLUCOMTR-MCNC: 103 MG/DL (ref 70–130)

## 2019-12-19 PROCEDURE — G0378 HOSPITAL OBSERVATION PER HR: HCPCS

## 2019-12-19 PROCEDURE — 0 IOPAMIDOL PER 1 ML: Performed by: HOSPITALIST

## 2019-12-19 PROCEDURE — 82962 GLUCOSE BLOOD TEST: CPT

## 2019-12-19 PROCEDURE — 70496 CT ANGIOGRAPHY HEAD: CPT

## 2019-12-19 PROCEDURE — 99223 1ST HOSP IP/OBS HIGH 75: CPT | Performed by: HOSPITALIST

## 2019-12-19 PROCEDURE — 70498 CT ANGIOGRAPHY NECK: CPT

## 2019-12-19 RX ORDER — MECLIZINE HYDROCHLORIDE 25 MG/1
25 TABLET ORAL DAILY PRN
COMMUNITY

## 2019-12-19 RX ORDER — ONDANSETRON 2 MG/ML
4 INJECTION INTRAMUSCULAR; INTRAVENOUS EVERY 6 HOURS PRN
Status: DISCONTINUED | OUTPATIENT
Start: 2019-12-19 | End: 2019-12-21 | Stop reason: HOSPADM

## 2019-12-19 RX ORDER — AMOXICILLIN 250 MG
2 CAPSULE ORAL 2 TIMES DAILY PRN
Status: DISCONTINUED | OUTPATIENT
Start: 2019-12-19 | End: 2019-12-21 | Stop reason: HOSPADM

## 2019-12-19 RX ORDER — ACETAMINOPHEN 325 MG/1
650 TABLET ORAL EVERY 4 HOURS PRN
Status: DISCONTINUED | OUTPATIENT
Start: 2019-12-19 | End: 2019-12-21 | Stop reason: HOSPADM

## 2019-12-19 RX ORDER — PAROXETINE 10 MG/1
10 TABLET, FILM COATED ORAL DAILY
Status: DISCONTINUED | OUTPATIENT
Start: 2019-12-19 | End: 2019-12-21 | Stop reason: HOSPADM

## 2019-12-19 RX ORDER — SODIUM CHLORIDE 0.9 % (FLUSH) 0.9 %
10 SYRINGE (ML) INJECTION AS NEEDED
Status: DISCONTINUED | OUTPATIENT
Start: 2019-12-19 | End: 2019-12-21 | Stop reason: HOSPADM

## 2019-12-19 RX ORDER — PANTOPRAZOLE SODIUM 40 MG/1
40 TABLET, DELAYED RELEASE ORAL
Status: DISCONTINUED | OUTPATIENT
Start: 2019-12-20 | End: 2019-12-21 | Stop reason: HOSPADM

## 2019-12-19 RX ORDER — BISACODYL 10 MG
10 SUPPOSITORY, RECTAL RECTAL DAILY PRN
Status: DISCONTINUED | OUTPATIENT
Start: 2019-12-19 | End: 2019-12-21 | Stop reason: HOSPADM

## 2019-12-19 RX ORDER — SODIUM CHLORIDE 0.9 % (FLUSH) 0.9 %
10 SYRINGE (ML) INJECTION EVERY 12 HOURS SCHEDULED
Status: DISCONTINUED | OUTPATIENT
Start: 2019-12-19 | End: 2019-12-21 | Stop reason: HOSPADM

## 2019-12-19 RX ORDER — BISACODYL 5 MG/1
5 TABLET, DELAYED RELEASE ORAL DAILY PRN
Status: DISCONTINUED | OUTPATIENT
Start: 2019-12-19 | End: 2019-12-21 | Stop reason: HOSPADM

## 2019-12-19 RX ORDER — MONTELUKAST SODIUM 10 MG/1
10 TABLET ORAL NIGHTLY
Status: DISCONTINUED | OUTPATIENT
Start: 2019-12-19 | End: 2019-12-21 | Stop reason: HOSPADM

## 2019-12-19 RX ORDER — ACETAMINOPHEN 650 MG/1
650 SUPPOSITORY RECTAL EVERY 4 HOURS PRN
Status: DISCONTINUED | OUTPATIENT
Start: 2019-12-19 | End: 2019-12-21 | Stop reason: HOSPADM

## 2019-12-19 RX ORDER — LANOLIN ALCOHOL/MO/W.PET/CERES
1000 CREAM (GRAM) TOPICAL DAILY
Status: DISCONTINUED | OUTPATIENT
Start: 2019-12-19 | End: 2019-12-21 | Stop reason: HOSPADM

## 2019-12-19 RX ORDER — ONDANSETRON 4 MG/1
4 TABLET, FILM COATED ORAL EVERY 6 HOURS PRN
Status: DISCONTINUED | OUTPATIENT
Start: 2019-12-19 | End: 2019-12-21 | Stop reason: HOSPADM

## 2019-12-19 RX ORDER — ASPIRIN 81 MG/1
81 TABLET, CHEWABLE ORAL DAILY
Status: DISCONTINUED | OUTPATIENT
Start: 2019-12-19 | End: 2019-12-21 | Stop reason: HOSPADM

## 2019-12-19 RX ADMIN — IOPAMIDOL 75 ML: 755 INJECTION, SOLUTION INTRAVENOUS at 20:19

## 2019-12-19 RX ADMIN — TICAGRELOR 90 MG: 90 TABLET ORAL at 19:57

## 2019-12-19 RX ADMIN — MONTELUKAST SODIUM 10 MG: 10 TABLET, COATED ORAL at 19:57

## 2019-12-19 RX ADMIN — ASPIRIN 81 MG 81 MG: 81 TABLET ORAL at 19:57

## 2019-12-19 RX ADMIN — CYANOCOBALAMIN TAB 1000 MCG 1000 MCG: 1000 TAB at 19:57

## 2019-12-19 RX ADMIN — SODIUM CHLORIDE, PRESERVATIVE FREE 10 ML: 5 INJECTION INTRAVENOUS at 19:57

## 2019-12-19 NOTE — NURSING NOTE
ACC REVIEW REPORT: [facility]        PATIENT NAME: Leti Castañeda    PATIENT ID: [id number]    BED:  3E / s 345    BED TYPE:  TELEMETRY    BED GIVEN TO:   RUDDY ARDON RN    TIME BED GIVEN:  15:32    YOB: 1945    AGE:  74    GENDER:   FEMALE    PREVIOUS ADMIT TO [facility]:  NO    PREVIOUS ADMISSION DATE:  NONE, she has seen Dr Adler for Cardiology Care    PATIENT CLASS:  OUTPATIENT    TODAY'S DATE: 12/19/2019    TRANSFER DATE:  12/19/2019    ETA:  16:45 - 17:15    TRANSFERRING FACILITY:  [facility]    TRANSFERRING FACILITY PHONE # :  [phone]   ED    TRANSFERRING MD:  DR RENEA MCKEON    DATE/TIME REQUEST RECEIVED:  12/19/2019 @ 14:37    [facility] RN:  AURELIO PARRISH RN    REPORT FROM:  RUDDY ARDON RN    TIME REPORT TAKEN:  14:48    DIAGNOSIS:  CVA, LAST NORMAL AT 09:30 TODAY    REASON FOR TRANSFER TO [facility]:  Pt needs evaluation and management by Stroke MD    TRANSPORTATION:  [facility]    CLINICAL REASON FOR TRANSFER TO [facility]:  The patient went to bed last night 23:30. She woke up at 09:30 this a.m. And couldn't walk well, thought she was numb in her right arm and leg.  Her daughter saw her at 10:00 and noticed she was not walking well, Mrs Castañeda thought her legs were wobbly. BP per Daughter was 220/196, THE DAUGHTER GAVE HER NORVASC 10 MG PO. She presented to the ED AT 12:50 with some decreased sensation and a slight drift in her right arm and leg. NIHSS was a 3.  The CT Scan shows an old lacunar infarct, no change.  Admission BP was 184/94.       CLINICAL INFORMATION    HEIGHT:  5'2''    WEIGHT:   199 lb    ALLERGIES:   CODEINE    1ST VITAL SIGNS:   TIME: 13:00   B/P: 184/94        LAST VITAL SIGNS:  TIME:  14:48   TEMP:  98.3  PULSE: 61   B/P: 185/102   RESP:  18, O2 SAT 94% ON ROOM AIR    LAB INFORMATION:  [id number]    MEDS/IV FLUIDS:  IV # 20 ga Lt A/C,       CARDIAC SYSTEM:    CHEST PAIN:  NO     RHYTHM:  SINUS RHYTHM    Is patient taking or  has patient been given any drugs that could increase bleeding?    YES  (Plavix, Brilinta, Effient, Eliquis, Xarelto, Warfarin, Integrilin, Angiomax)    DRUG:   BRILINTA    DOSE/FREQUENCY:    90 mg p.o. BID    CARDIAC NOTES: The pt has a history of colon cancer, GERD, CAD, MI, and PIN STROKES.   She has had 2 Cardiac Stents    CARDIOLOGIST:  DR DUNLAP      RESPIRATORY SYSTEM:    LUNG SOUNDS:    CLEAR:      OXYGEN:  NO    O2 SAT:   94%    ADMINISTRATION ROUTE:  ROOM AIR    RESPIRATORY STATUS:  BREATH SOUNDS ARE CTA      CNS/MUSCULOSKELETAL      LAMAR COMA SCALE:    E:  4  M:  6  V:  5    LAST KNOWN WELL:  23:30 LAST NIGHT    NIHSS    Survey Item  0: Means Alert  1: Drowsy or Answer Correctly  2: Incorrect, Forced, Can't Resist Gravity  3: Complete or No Effort  4: No Movement  NT: Not Testable Acceptable As Noted Above    1A: Level of Consciousness (alert, drowsy, etc) :  0    1B: LOC Questions (month, age) :  0    1C: LOC Commands (open/close eyes, make a fist & let go):  0    2:  Best Gaze (eyes open-pt follows examiner's fingers or face):  0    3:  Visual (introduce visual stimulus/threat to pt's visual field quad. Cover 1 eye and hold up fingers in all 4 quadrants) :  0    4.  Facial Palsy (show teeth, raise eyebrows and squeeze eyes tightly shut):  0    5A: Motor Arm-Left (elevate extremity to 90 degrees and score drift/movement.  Count to 10 aloud and use fingers for visual cue):   0    5B:  Motor Arm-Right (elevate extremity to 90 degrees and score drift/movement.  Count to 10 aloud and use fingers for visual cue): 1     6A:  Motor Leg-Left (elevate extremity to 30 degrees and score drift/movement.  Count to 5 out loud and use fingers for visual cue):  0    6B:  Motor Leg-Right (elevate extremity to 30 degrees and score drift/movement.  Count to 5 out loud and use fingers for visual cue):  1    7:  Limb Ataxia- finger to nose, heel down shin:  0    8:  Sensory- pin prick to face, arms, trunk, and legs. Compare  sharpness side to side:  1    9:  Best Language- name, items, describe picture, and read sentences.  Do not forget glasses if they normally wear them:  0    10: Dysarthria- elevate speech clarity by pt reading or repeating words on a list:  0    11: Extinction and Inattention- Use information from prior testing or double simultaneous stimuli testing to identify neglect. Face, arms, legs and visual field:  0    Total NIHSS Score:  3  Date:  12/19/2019  Time of NIHSS Assessment:  In ED @ [facility]    CAT SCAN RESULTS:  12/19/2019 shows old lacunar infarct, no change, a copy will be coming with the patient.    CNS/MUSCULOSKELETAL NOTES:   The patient is alert, answering questions, talking with her daughter.       GI//GY      ABDOMINAL PAIN: NO    VOMITING:  NO    DIARRHEA:  NO    NAUSEA:  NO    BOWEL SOUNDS:  ACTIVE    GI//GY NOTES:  Patient has been NPO     PAST MEDICAL HISTORY:  Colon Cancer, GERD, CAD, Pancreatitis, Arthritis, Depression, MI, Pin Strokes, and Histoplasmosis.  Surgical History:  2 feet of colon removed r/t cancer, Cholecystectomy, 2 Cardiac Stents, Tubal and Right Knee Arthroscopy    OTHER SYMPTOM NOTES:      ADDITIONAL NOTES:            Daniella Weinberg RN  12/19/2019  3:28 PM

## 2019-12-19 NOTE — H&P
[facility] Medicine Services  HISTORY AND PHYSICAL    Patient Name: Leti Castañeda  : 1945  MRN: [id number]  Primary Care Physician: Austyn Tee MD  Date of admission: 2019      Subjective   Subjective     Chief Complaint:  Right side numbness, and gait instability     HPI:  Leti Castañeda is a 74 y.o. female with PMH of GERD, HTN, IBS, CAD with stents, histoplasmosis, colon cancer. Patient presented to OSH ED with complaints of gait instability, numbness in right arm and leg. Patient went to bed at 23:30 last night in normal state of health when she woke up at 9:30 with right arm and leg numbness. Patient states she tried to stand and walk and couldn't get her legs to move how she wanted. Per daughter patient was staggering when she was trying to walk and was unable to walk up stairs by herself. Patient is normally independent with ADL's and does not require help.  Patient blood pressure at home was also 220/196. Patient took a Norvasc at home.  CT scan shows old lacunar infarct. Patient was transferred to [facility] for further evaluation. Patient had a heart cath with stent placed in 2019 at [facility]. She follows with Dr. Adler.     Labs at OSH: creat 0.8, WBC 7.5, , K 4.3, Na 135, trop 0.009, bnp 121    Review of Systems   Constitutional: Negative for chills and fever.   Respiratory: Negative for shortness of breath.    Cardiovascular: Negative for chest pain and leg swelling.   Gastrointestinal: Negative for constipation, diarrhea, nausea and vomiting.   Genitourinary: Negative for dysuria.   Musculoskeletal: Positive for gait problem.   Neurological: Positive for weakness. Negative for dizziness, speech difficulty and headaches.   Psychiatric/Behavioral: Negative for confusion.      All other systems reviewed and are negative.     Personal History     Past Medical History:   Diagnosis Date   • Abnormal ECG    • Arthritis    • Cancer (CMS/HCC)     HX OF  COLON    • Cirrhosis (CMS/HCC)    • Coronary artery disease    • Depression    • Fatty liver    • GERD (gastroesophageal reflux disease)    • Histoplasmosis    • History of transfusion    • Hypertension    • IBS (irritable bowel syndrome)    • Myocardial infarction (CMS/HCC) 08/30/2019   • Osteoporosis    • Pancreatitis     W[place]OLD [place] POSSIBLE HEP . B IN 2010 [place] A [place]RGENT [place]   • Right wrist sprain        Past Surgical History:   Procedure Laterality Date   • ABDOMINAL AORTA STENT      PATIENT STATES DOES NOT HAVE    • CARDIAC CATHETERIZATION     • CHOLECYSTECTOMY     • COLON SURGERY     • CORONARY ANGIOPLASTY WITH STENT PLACEMENT  2001   • KNEE ARTHROSCOPY Right 5/30/2018    Procedure: Diagnostic arthroscopy right knee partial lateral meniscectomy;  Surgeon: Luciano Terry MD;  Location: [facility];  Service: Orthopedics   • OTHER SURGICAL HISTORY      PORTACATH REMOVAL   • PORTACATH PLACEMENT         Family History: family history includes Cancer in her father; Diabetes in an other family member; Hypertension in an other family member; Stroke in her mother. Otherwise pertinent FHx was reviewed and unremarkable.     Social History:  reports that she has never smoked. She has never used smokeless tobacco. She reports that she does not drink alcohol or use drugs.  Social History     Social History Narrative    Independent with adl's        Medications:  Available home medication information reviewed.  Medications Prior to Admission   Medication Sig Dispense Refill Last Dose   • Evolocumab (REPATHA) 140 MG/ML solution prefilled syringe Inject 140 mg under the skin into the appropriate area as directed 1 (One) Time Per Week.      • meclizine (ANTIVERT) 25 MG tablet Take 25 mg by mouth Daily As Needed for Dizziness.      • aspirin 81 MG chewable tablet Chew 81 mg Daily.   Taking   • levocetirizine (XYZAL) 5 MG tablet Take 5 mg by mouth Every Evening.   Taking   • metoprolol tartrate (LOPRESSOR) 25 MG  tablet Take 12.5 mg by mouth 2 (Two) Times a Day.   Taking   • montelukast (SINGULAIR) 10 MG tablet Take 10 mg by mouth Every Night.   Taking   • Omega-3 Fatty Acids (FISH OIL) 1000 MG capsule capsule Take 1,000 mg by mouth Every Night.   Taking   • omeprazole (priLOSEC) 20 MG capsule Take 40 mg by mouth Daily.   Taking   • PARoxetine (PAXIL) 10 MG tablet Take 10 mg by mouth Every Morning.   Taking   • ticagrelor (BRILINTA) 90 MG tablet tablet Take 90 mg by mouth 2 (Two) Times a Day.   Taking   • valsartan-hydrochlorothiazide (DIOVAN HCT) 160-12.5 MG per tablet Take 1 tablet by mouth 2 (Two) Times a Day. (Patient taking differently: Take 1 tablet by mouth Daily.) 60 tablet 0    • vitamin B-12 (CYANOCOBALAMIN) 1000 MCG tablet Take 1,000 mcg by mouth Daily.   Taking   • vitamin D (ERGOCALCIFEROL) 35112 units capsule capsule Take 50,000 Units by mouth 1 (One) Time Per Week.   Taking       Allergies   Allergen Reactions   • Codeine GI Intolerance       Objective   Objective     Vital Signs:   Temp:  [97.7 °F (36.5 °C)] 97.7 °F (36.5 °C)  Heart Rate:  [56-61] 61  Resp:  [18] 18  BP: (190-194)/(78-95) 190/95   Total (NIH Stroke Scale): 2    Physical Exam   Constitutional: Awake, alert  Eyes: PERRLA, sclerae anicteric, no conjunctival injection  HENT: NCAT, mucous membranes moist  Neck: Supple, trachea midline  Respiratory: Clear to auscultation bilaterally, nonlabored respirations room air 95%  Cardiovascular: RRR, no murmurs, rubs, or gallops, palpable pedal pulses bilaterally  Gastrointestinal: Positive bowel sounds, soft, nontender, nondistended  Musculoskeletal: No bilateral ankle edema, no clubbing or cyanosis to extremities  Psychiatric: Appropriate affect, cooperative  Neurologic: Oriented x 3, strength symmetric in all extremities, Cranial Nerves grossly intact to confrontation, speech clear, numbness on right side of nose, right leg and tingling in right hand. Strength equal and no drift noted  Skin: No  camilla    Results Reviewed:  I have personally reviewed current lab and radiology data.              Invalid input(s):  ALKPHOS  CrCl cannot be calculated (Patient's most recent lab result is older than the maximum 30 days allowed.).  Brief Urine Lab Results     None        Imaging Results (Last 24 Hours)     ** No results found for the last 24 hours. **             Assessment/Plan   Assessment / Plan     Active Hospital Problems    Diagnosis POA   • Upper extremity weakness [R29.898] Yes   • Stroke (cerebrum) (CMS/HCC) [I63.9] Yes   • Statin intolerance [Z78.9] Yes     Elevated liver enzymes     • GERD (gastroesophageal reflux disease) [K21.9] Yes   • Essential hypertension [I10] Yes   • Dyslipidemia [E78.5] Yes     CVA  Right sided numbness and gait instability   -- CTA of head and neck tonight  -- consult neurology  -- consult PT,OT,CM, SPeech  -- ECHO in am   -- cont home dose of ASA 81 mg and Brillinta (cardiac stent in August 2019 at [facility])  -- on Repatha at home got dose on 12/18/19 (unable to tolerate statin therapy due to liver cirrhosis and severe pancreatitis)  Case discussed with Dr. Elizondo prior to patient's arrival.  Dr. Elizondo is requesting CT angiogram of the head and neck tonight if renal function is normal, as well as MRI of the brain/rest of the stroke work-up, otherwise MRI of the brain and MRA of the head and neck/rest of the stroke work-up.    GERD  -- cont PPI    HTN  -- hold home meds and allow permissive HTN    HLD  -- check lipid panel  -- unable to tolerate statins  -- on Repatha at home got dose on 12/18/19    Cirrhosis  Fatty liver  -- just had recent scan and was told it was stable     DVT prophylaxis:  [facility]     CODE STATUS:    Code Status and Medical Interventions:   Ordered at: 12/19/19 5574     Level Of Support Discussed With:    Patient     Code Status:    CPR     Medical Interventions (Level of Support Prior to Arrest):    Full       Admission Status:  I  believe this patient meets OBSERVATION status, however if further evaluation or treatment plans warrant, status may change.  Based upon current information, I predict patient's care encounter to be less than or equal to 2 midnights.    Electronically signed by ANISA Packer, 12/19/19, 4:55 PM.      Attending   Admission Attestation       I have seen and examined the patient, performing an independent face-to-face diagnostic evaluation with plan of care reviewed and developed with the advanced practice clinician (APC).      Brief Summary Statement:   Leti Castañeda is a 74 y.o. female with past medical history significant for CAD- stent placement at [facility] in August 2019 and currently on Brilinta and aspirin 81 mg daily, hypertension, hyperlipidemia- unable to tolerate statin therapy due to pancreatitis and liver cirrhosis, and KAUFMAN/liver cirrhosis, who was transferred from [facility] for stroke work-up.    Patient was known to be well last night around 11 PM when she went to bed.  She woke up this morning around 9 AM and noticed that her right arm/fingers were numb and heavy.  She also noticed some incoordination of that arm.  She got herself out of bed and noticed that she had some gait instability, reporting that her legs wanted to give out on her, especially the right leg.  She did not seek medical attention right away.  She denies any dysphagia or dysarthria.  No headache, changes in vision, dizziness, chest pain, shortness of air, abdominal pain, nausea, vomiting, diarrhea, dysuria, hematochezia, or melena. She denies any other focal neurological deficits.  Her daughter came to pick her up to go to her son's house and patient stated that she was having trouble with climbing the stairs, so her daughter brought her to [facility] sometime after 1 PM.  Initial work-up at outside hospital was unremarkable.  Her NIH score was 3.  Dr. Elizondo was consulted  and accepted the patient in transfer for further stroke work-up.      Remainder of detailed HPI is as noted by APC and has been reviewed and/or edited by me for completeness.    Attending Physical Exam:  General Assessment: No acute cardiopulmonary distress. Well developed and well nourished.    HEENT: NCAT, PERRL, MM moist    Neck: Supple, no carotid bruit bilat    CVS: RRR, S1S2 normal, no murmurs    Resp: CTAB, no adventitious sound    Abd: soft, NT, ND, normal BS, no guarding or peritoneal signs    Ext: No edema, both calves are symmetric and NTTP    Neuro: Cranial nerves II through XII are intact.  Motor 5 out of 5 in all extremities and symmetric, no pronator drift,  strength symmetric and within normal limits, sensation blunted on the right lower extremity and right upper extremity,+ dysmetria of the right upper extremity/right lower extremity, negative Babinski's sign on the left, equivocal Babinski's sign on the right.    Skin: W/D/I. No rash.    Psych: Affect is appropriate        Brief Assessment/Plan :  See detailed assessment and plan developed with APC which I have reviewed and/or edited for completeness.    Electronically signed by Lynette Nunes MD, 12/19/19, 6:48 PM.

## 2019-12-20 ENCOUNTER — APPOINTMENT (OUTPATIENT)
Dept: MRI IMAGING | Facility: HOSPITAL | Age: 74
End: 2019-12-20

## 2019-12-20 ENCOUNTER — APPOINTMENT (OUTPATIENT)
Dept: OTHER | Facility: HOSPITAL | Age: 74
End: 2019-12-20

## 2019-12-20 ENCOUNTER — APPOINTMENT (OUTPATIENT)
Dept: CARDIOLOGY | Facility: HOSPITAL | Age: 74
End: 2019-12-20

## 2019-12-20 DIAGNOSIS — Z00.6 EXAMINATION FOR NORMAL COMPARISON FOR CLINICAL RESEARCH: ICD-10-CM

## 2019-12-20 PROBLEM — Z78.9 IMPAIRED MOBILITY AND ADLS: Status: ACTIVE | Noted: 2019-12-20

## 2019-12-20 PROBLEM — Z74.09 IMPAIRED MOBILITY AND ADLS: Status: ACTIVE | Noted: 2019-12-20

## 2019-12-20 LAB
ANION GAP SERPL CALCULATED.3IONS-SCNC: 13 MMOL/L (ref 5–15)
BASOPHILS # BLD AUTO: 0.05 10*3/MM3 (ref 0–0.2)
BASOPHILS NFR BLD AUTO: 0.8 % (ref 0–1.5)
BUN BLD-MCNC: 17 MG/DL (ref 8–23)
BUN/CREAT SERPL: 20.7 (ref 7–25)
CALCIUM SPEC-SCNC: 9.6 MG/DL (ref 8.6–10.5)
CHLORIDE SERPL-SCNC: 96 MMOL/L (ref 98–107)
CHOLEST SERPL-MCNC: 110 MG/DL (ref 0–200)
CO2 SERPL-SCNC: 26 MMOL/L (ref 22–29)
CREAT BLD-MCNC: 0.82 MG/DL (ref 0.57–1)
DEPRECATED RDW RBC AUTO: 45.7 FL (ref 37–54)
EOSINOPHIL # BLD AUTO: 0.16 10*3/MM3 (ref 0–0.4)
EOSINOPHIL NFR BLD AUTO: 2.6 % (ref 0.3–6.2)
ERYTHROCYTE [DISTWIDTH] IN BLOOD BY AUTOMATED COUNT: 12.7 % (ref 12.3–15.4)
GFR SERPL CREATININE-BSD FRML MDRD: 68 ML/MIN/1.73
GLUCOSE BLD-MCNC: 136 MG/DL (ref 65–99)
GLUCOSE BLDC GLUCOMTR-MCNC: 117 MG/DL (ref 70–130)
HBA1C MFR BLD: 6.4 % (ref 4.8–5.6)
HCT VFR BLD AUTO: 45.6 % (ref 34–46.6)
HDLC SERPL-MCNC: 33 MG/DL (ref 40–60)
HGB BLD-MCNC: 14.9 G/DL (ref 12–15.9)
IMM GRANULOCYTES # BLD AUTO: 0.01 10*3/MM3 (ref 0–0.05)
IMM GRANULOCYTES NFR BLD AUTO: 0.2 % (ref 0–0.5)
LDLC SERPL CALC-MCNC: 49 MG/DL (ref 0–100)
LDLC/HDLC SERPL: 1.5 {RATIO}
LYMPHOCYTES # BLD AUTO: 2.33 10*3/MM3 (ref 0.7–3.1)
LYMPHOCYTES NFR BLD AUTO: 37.8 % (ref 19.6–45.3)
MCH RBC QN AUTO: 32 PG (ref 26.6–33)
MCHC RBC AUTO-ENTMCNC: 32.7 G/DL (ref 31.5–35.7)
MCV RBC AUTO: 97.9 FL (ref 79–97)
MONOCYTES # BLD AUTO: 0.6 10*3/MM3 (ref 0.1–0.9)
MONOCYTES NFR BLD AUTO: 9.7 % (ref 5–12)
NEUTROPHILS # BLD AUTO: 3.01 10*3/MM3 (ref 1.7–7)
NEUTROPHILS NFR BLD AUTO: 48.9 % (ref 42.7–76)
NRBC BLD AUTO-RTO: 0 /100 WBC (ref 0–0.2)
PLATELET # BLD AUTO: 237 10*3/MM3 (ref 140–450)
PMV BLD AUTO: 11.1 FL (ref 6–12)
POTASSIUM BLD-SCNC: 4.4 MMOL/L (ref 3.5–5.2)
RBC # BLD AUTO: 4.66 10*6/MM3 (ref 3.77–5.28)
SODIUM BLD-SCNC: 135 MMOL/L (ref 136–145)
TRIGL SERPL-MCNC: 138 MG/DL (ref 0–150)
VLDLC SERPL-MCNC: 27.6 MG/DL
WBC NRBC COR # BLD: 6.16 10*3/MM3 (ref 3.4–10.8)

## 2019-12-20 PROCEDURE — 99232 SBSQ HOSP IP/OBS MODERATE 35: CPT | Performed by: INTERNAL MEDICINE

## 2019-12-20 PROCEDURE — 97530 THERAPEUTIC ACTIVITIES: CPT

## 2019-12-20 PROCEDURE — 92523 SPEECH SOUND LANG COMPREHEN: CPT

## 2019-12-20 PROCEDURE — G0108 DIAB MANAGE TRN  PER INDIV: HCPCS

## 2019-12-20 PROCEDURE — 83036 HEMOGLOBIN GLYCOSYLATED A1C: CPT | Performed by: NURSE PRACTITIONER

## 2019-12-20 PROCEDURE — 85025 COMPLETE CBC W/AUTO DIFF WBC: CPT | Performed by: NURSE PRACTITIONER

## 2019-12-20 PROCEDURE — 97165 OT EVAL LOW COMPLEX 30 MIN: CPT

## 2019-12-20 PROCEDURE — 70551 MRI BRAIN STEM W/O DYE: CPT

## 2019-12-20 PROCEDURE — 82962 GLUCOSE BLOOD TEST: CPT

## 2019-12-20 PROCEDURE — 99223 1ST HOSP IP/OBS HIGH 75: CPT | Performed by: PSYCHIATRY & NEUROLOGY

## 2019-12-20 PROCEDURE — 93306 TTE W/DOPPLER COMPLETE: CPT | Performed by: INTERNAL MEDICINE

## 2019-12-20 PROCEDURE — 80061 LIPID PANEL: CPT | Performed by: NURSE PRACTITIONER

## 2019-12-20 PROCEDURE — 93306 TTE W/DOPPLER COMPLETE: CPT

## 2019-12-20 PROCEDURE — 80048 BASIC METABOLIC PNL TOTAL CA: CPT | Performed by: NURSE PRACTITIONER

## 2019-12-20 PROCEDURE — 97162 PT EVAL MOD COMPLEX 30 MIN: CPT

## 2019-12-20 RX ADMIN — CYANOCOBALAMIN TAB 1000 MCG 1000 MCG: 1000 TAB at 09:11

## 2019-12-20 RX ADMIN — TICAGRELOR 90 MG: 90 TABLET ORAL at 20:28

## 2019-12-20 RX ADMIN — MONTELUKAST SODIUM 10 MG: 10 TABLET, COATED ORAL at 20:28

## 2019-12-20 RX ADMIN — PAROXETINE HYDROCHLORIDE 10 MG: 10 TABLET, FILM COATED ORAL at 09:11

## 2019-12-20 RX ADMIN — ASPIRIN 81 MG 81 MG: 81 TABLET ORAL at 09:11

## 2019-12-20 RX ADMIN — PANTOPRAZOLE SODIUM 40 MG: 40 TABLET, DELAYED RELEASE ORAL at 05:57

## 2019-12-20 RX ADMIN — TICAGRELOR 90 MG: 90 TABLET ORAL at 09:11

## 2019-12-20 RX ADMIN — SODIUM CHLORIDE, PRESERVATIVE FREE 10 ML: 5 INJECTION INTRAVENOUS at 09:12

## 2019-12-20 RX ADMIN — SODIUM CHLORIDE, PRESERVATIVE FREE 10 ML: 5 INJECTION INTRAVENOUS at 20:28

## 2019-12-20 NOTE — PLAN OF CARE
Problem: Patient Care Overview  Goal: Plan of Care Review  Outcome: Ongoing (interventions implemented as appropriate)  Flowsheets (Taken 12/20/2019 8691)  Plan of Care Reviewed With: patient; daughter  Note:   SLP evaluation completed. Will sign-off as no deficits identified with speech, language or cognition at this time. . Please see note for further details and recommendations.

## 2019-12-20 NOTE — CONSULTS
"Diabetes Education  Assessment/Teaching    Patient Name:  Leti Castañeda  YOB: 1945  MRN: [id number]  Admit Date:  12/19/2019      Assessment Date:  12/20/2019    Most Recent Value   General Information    Referral From:  A1c   Height  157.5 cm (62.01\")   Height Method  Stated   Weight  86.2 kg (190 lb 0.6 oz)   Weight Method  Bed scale   Pregnancy Assessment   Diabetes History   What type of diabetes do you have?  Pre-diabetes   Current DM knowledge  fair   Have you had diabetes education/teaching in the past?  no   Do you test your blood sugar at home?  no   Education Preferences   What areas of diabetes would you like to learn about?  avoiding high blood sugar, avoiding low blood sugar, diabetes complications, diet information, exercise information, medications for diabetes, stress/coping skills   Nutrition Information   Assessment Topics   Healthy Eating - Assessment  Needs education   Being Active - Assessment  Needs education   Taking Medication - Assessment  Needs education   Problem Solving - Assessment  Competent   Reducing Risk - Assessment  Competent   Healthy Coping - Assessment  Competent   Monitoring - Assessment  Needs education   DM Goals            Most Recent Value   DM Education Needs   Meter  -- [refused meter]   Problem Solving  Hypoglycemia, Hyperglycemia, Sick days, Signs, Symptoms, Treatment   Reducing Risks  A1C testing   Physical Activity  Walking   Motivation  Engaged   Teaching Method  Explanation, Discussion, Handouts   Patient Response  Needs reinforcement, Verbalized understanding            Other Comments:  Patient consented to prediabetes education      Patient educated on Pre-diabetes, diabetes and the disease process, types of DM, diagnosis/A1C, monitoring, signs and symptoms, activity and exercise and how to prevent disease from progressing. Changing behavior and goal setting relative to diet, exercise and healthy lifestyle was emphasized.Pt advised to consult " with PCP for further instructions, discuss A1C result of 6.4, and POC. Education handouts provided, questions answered and pt. advised to call with any other questions or concerns. Patient refused nutrition consult at this time. Thank you for this consult, should patient needs change please re consult us.      Electronically signed by:  Riley Zurita RN  12/20/19 2:24 PM

## 2019-12-20 NOTE — THERAPY DISCHARGE NOTE
Acute Care - Speech Language Pathology Initial Eval/Discharge  [facility]   Cognitive-Communication Evaluation       Patient Name: Leti Castañeda  : 1945  MRN: [id number]  Today's Date: 2019               Admit Date: 2019     Visit Dx:    ICD-10-CM ICD-9-CM   1. Impaired mobility and ADLs Z74.09 799.89     Patient Active Problem List   Diagnosis   • CAD (coronary artery disease)   • Essential hypertension   • Dyslipidemia   • GERD (gastroesophageal reflux disease)   • Cirrhosis, non-alcoholic (CMS/HCC)   • Statin intolerance   • Chronic pancreatitis (CMS/HCC)   • Upper extremity weakness   • Stroke (cerebrum) (CMS/HCC)   • Impaired mobility and ADLs     Past Medical History:   Diagnosis Date   • Abnormal ECG    • Arthritis    • Cancer (CMS/HCC)     HX OF COLON    • Cirrhosis (CMS/HCC)    • Coronary artery disease    • Depression    • Fatty liver    • GERD (gastroesophageal reflux disease)    • Histoplasmosis    • History of transfusion    • Hypertension    • IBS (irritable bowel syndrome)    • Myocardial infarction (CMS/HCC) 2019   • Osteoporosis    • Pancreatitis     [place] POSSIBLE HEP . B [place]   • Right wrist sprain      Past Surgical History:   Procedure Laterality Date   • ABDOMINAL AORTA STENT      PATIENT STATES DOES NOT HAVE    • CARDIAC CATHETERIZATION     • CHOLECYSTECTOMY     • COLON SURGERY     • CORONARY ANGIOPLASTY WITH STENT PLACEMENT     • KNEE ARTHROSCOPY Right 2018    Procedure: Diagnostic arthroscopy right knee partial lateral meniscectomy;  Surgeon: Luciano Terry MD;  Location: [facility];  Service: Orthopedics   • OTHER SURGICAL HISTORY      PORTACATH REMOVAL   • PORTACATH PLACEMENT            SLP EVALUATION (last 72 hours)      SLP SLC Evaluation     Row Name 19 1400                   Communication Assessment/Intervention    Document Type  evaluation  -CH        Subjective Information  no complaints  -CH         Patient Observations  alert;cooperative;agree to therapy  -        Patient/Family Observations  dtr present  -CH        Patient Effort  good  -CH           General Information    Patient Profile Reviewed  yes  -CH        Pertinent History Of Current Problem  Patient admitted on stroke pathway with right numbness and gait instability. SLC evaluation completed per stroke protocol.   -CH        Precautions/Limitations, Vision  WFL;for purposes of eval  -CH        Precautions/Limitations, Hearing  WFL;for purposes of eval  -CH        Prior Level of Function-Communication  WFL  -        Plans/Goals Discussed with  patient and family;agreed upon  -        Barriers to Rehab  none identified  -        Patient's Goals for Discharge  return to home  -           Pain Assessment    Additional Documentation  Pain Scale: FACES Pre/Post-Treatment (Group)  -           Pain Scale: Numbers Pre/Post-Treatment    Pain Scale: Numbers, Pretreatment  0/10 - no pain  -        Pain Scale: Numbers, Post-Treatment  0/10 - no pain  -           Pain Scale: FACES Pre/Post-Treatment    Pain: FACES Scale, Pretreatment  0-->no hurt  -CH        Pain: FACES Scale, Post-Treatment  0-->no hurt  -           Comprehension Assessment/Intervention    Comprehension Assessment/Intervention  Auditory Comprehension;Reading Comprehension  -           Auditory Comprehension Assessment/Intervention    Auditory Comprehension (Communication)  WNL  -        Able to Identify Objects/Pictures (Communication)  body part;familiar objects;WNL  -        Answers Questions (Communication)  yes/no;wh questions;personal;simple;concrete;mulit-unit;abstract;WFL  -        Able to Follow Commands (Communication)  3-step;WFL  -        Narrative Discourse  conversational level;WFL  -           Reading Comprehension Assessment/Intervention    Reading Comprehension (Communication)  WNL  -        Functional Reading Tasks  WNL  -CH            Expression Assessment/Intervention    Expression Assessment/Intervention  verbal expression;graphic expression  -CH           Verbal Expression Assessment/Intervention    Verbal Expression  WNL  -        Automatic Speech (Communication)  response to greeting;counting 1-20;WNL  -        Repetition  sentences;WNL  -        Phrase Completion  automatic/predictable;unpredictable;WNL  -        Responsive Naming  complex;WNL  -        Confrontational Naming  high frequency;low frequency;WNL  -        Spontaneous/Functional Words  complex;WNL  -        Sentence Formulation  complex;WNL  -        Conversational Discourse/Fluency  WNL  -           Graphic Expression Assessment/Intervention    Graphic Expression  WNL  -        Biographical Information  name;WNL  -           Oral Motor Structure and Function    Oral Motor Structure and Function  WNL  -           Oral Musculature and Cranial Nerve Assessment    Oral Motor General Assessment  WFL  -           Motor Speech Assessment/Intervention    Motor Speech Function  WNL  -           Cursory Voice Assessment/Intervention    Quality and Resonance (Voice)  WNL  -           Cognitive Assessment Intervention- SLP    Cognitive Function (Cognition)  WNL  -        Orientation Status (Cognition)  awareness of basic personal information;person;place;time;situation;WNL  -        Memory (Cognitive)  simple;functional;immediate;short-term;long-term;delayed;WNL  -        Attention (Cognitive)  selective;sustained;WNL  -        Thought Organization (Cognitive)  concrete divergent;abstract divergent;concrete convergent;abstract convergent;verbal sequencing;WNL  -        Reasoning (Cognitive)  simple;mod-complex;WNL  -        Problem Solving (Cognitive)  simple;divergent;temporal;WNL  -        Functional Math (Cognitive)  simple;word problems;WNL  -        Executive Function (Cognition)  WNL  -        Pragmatics (Communication)  WNL  -            SLP Clinical Impressions    SLP Diagnosis  No deficits identified with speech, language or cognition at this time  -        SLC Criteria for Skilled Therapy Interventions Met  no problems identified which require skilled intervention  -        Functional Impact  no impact on function  -           Recommendations    Therapy Frequency (SLP SLC)  evaluation only  -        Anticipated Dischage Disposition  home  -           SLP Discharge Summary    Discharge Destination  unknown  -        Discharge Diagnostic Statement  No deficits identified with speech, language or cognition at this time  -        Progress Toward Achieving Short/long Term Goals  discharge on same date as initial evaluation  -        Reason for Discharge  all goals and outcomes met, no further needs identified  -          User Key  (r) = Recorded By, (t) = Taken By, (c) = Cosigned By    Initials Name Effective Dates    Divya Montemayor MS CCC-SLP 02/14/19 -            EDUCATION  The patient has been educated in the following areas:   Cognitive Impairment Communication Impairment.      SLP Recommendation and Plan  SLP Diagnosis: No deficits identified with speech, language or cognition at this time     SLC Criteria for Skilled Therapy Interventions Met: no problems identified which require skilled intervention  Anticipated Dischage Disposition: home                         Time Calculation:   Time Calculation- SLP     Row Name 12/20/19 1533             Time Calculation- [facility]    SLP Start Time  1400  -      SLP Received On  12/20/19  -        User Key  (r) = Recorded By, (t) = Taken By, (c) = Cosigned By    Initials Name Provider Type    Divya Montemayor MS CCC-SLP Speech and Language Pathologist          Therapy Charges for Today     Code Description Service Date Service Provider Modifiers Qty    [id number]  ST EVAL SPEECH AND PROD W LANG  4 12/20/2019 Divya Dunn MS CCC-SLP GN 1                   SLP  Discharge Summary  Anticipated Dischage Disposition: home  Reason for Discharge: all goals and outcomes met, no further needs identified  Progress Toward Achieving Short/long Term Goals: discharge on same date as initial evaluation  Discharge Destination: unknown    Divya Dunn MS CCC-SLP  12/20/2019

## 2019-12-20 NOTE — THERAPY EVALUATION
Acute Care - Occupational Therapy Initial Evaluation  [facility]     Patient Name: Leti Castañeda  : 1945  MRN: [id number]  Today's Date: 2019             Admit Date: 2019       ICD-10-CM ICD-9-CM   1. Impaired mobility and ADLs Z74.09 799.89     Patient Active Problem List   Diagnosis   • CAD (coronary artery disease)   • Essential hypertension   • Dyslipidemia   • GERD (gastroesophageal reflux disease)   • Cirrhosis, non-alcoholic (CMS/HCC)   • Statin intolerance   • Chronic pancreatitis (CMS/HCC)   • Upper extremity weakness   • Stroke (cerebrum) (CMS/HCC)     Past Medical History:   Diagnosis Date   • Abnormal ECG    • Arthritis    • Cancer (CMS/HCC)     HX OF COLON    • Cirrhosis (CMS/HCC)    • Coronary artery disease    • Depression    • Fatty liver    • GERD (gastroesophageal reflux disease)    • Histoplasmosis    • History of transfusion    • Hypertension    • IBS (irritable bowel syndrome)    • Myocardial infarction (CMS/HCC) 2019   • Osteoporosis    • Pancreatitis     WAS TOLD IN [place]. POSSIBLE HEP . B IN  THIS WAS AT A [place]RGENT C[place]   • Right wrist sprain      Past Surgical History:   Procedure Laterality Date   • ABDOMINAL AORTA STENT      PATIENT STATES DOES NOT HAVE    • CARDIAC CATHETERIZATION     • CHOLECYSTECTOMY     • COLON SURGERY     • CORONARY ANGIOPLASTY WITH STENT PLACEMENT     • KNEE ARTHROSCOPY Right 2018    Procedure: Diagnostic arthroscopy right knee partial lateral meniscectomy;  Surgeon: Luciano Terry MD;  Location: [facility];  Service: Orthopedics   • OTHER SURGICAL HISTORY      PORTACATH REMOVAL   • PORTACATH PLACEMENT            OT ASSESSMENT FLOWSHEET (last 12 hours)      Occupational Therapy Evaluation     Row Name 19 0825                   OT Evaluation Time/Intention    Subjective Information  no complaints  -AN        Document Type  evaluation  -AN        Mode of Treatment  occupational therapy  -AN        Patient Effort  good   -AN        Symptoms Noted During/After Treatment  none  -AN           General Information    Patient Profile Reviewed?  yes  -AN        Patient Observations  alert;cooperative;agree to therapy  -AN        Patient/Family Observations  daughter present  -AN        General Observations of Patient  Pt in bed [place] elevated, alarm  -AN        Prior Level of Function  independent:;all household mobility;community mobility;gait;transfer;ADL's;mod assist:;cooking;cleaning;dependent:;driving daughter does most of home mgmt, meds, drives  -AN        Equipment Currently Used at Home  ramp;shower chair;grab bar owns RW and cane, does not use  -AN        Pertinent History of Current Functional Problem  Pt to Ed with R side numbness/weakness, decreased mobility I  -AN        Existing Precautions/Restrictions  fall  -AN        Risks Reviewed  patient and family:;LOB;dizziness;increased discomfort;change in vital signs  -AN        Benefits Reviewed  patient and family:;improve function;increase strength;increase independence;increase balance  -AN        Barriers to Rehab  none identified  -AN           Relationship/Environment    Primary Source of Support/Comfort  child(greg)  -AN        Lives With  grandchild(greg)  -AN        Concerns About Impact on Relationships  spouse passed in Feburary, Grandson lives with her, daughter is close by  -AN           Resource/Environmental Concerns    Current Living Arrangements  home/apartment/condo  -AN           Cognitive Assessment/Interventions    Additional Documentation  Cognitive Assessment/Intervention (Group)  -AN           Cognitive Assessment/Intervention- PT/OT    Affect/Mental Status (Cognitive)  WFL  -AN        Orientation Status (Cognition)  oriented x 4  -AN        Follows Commands (Cognition)  WFL  -AN        Personal Safety Interventions  fall prevention program maintained  -AN           Safety Issues, Functional Mobility    Impairments Affecting Function (Mobility)   balance;strength;sensation/sensory awareness  -AN           Bed Mobility Assessment/Treatment    Bed Mobility Assessment/Treatment  supine-sit;sit-supine  -AN        Supine-Sit [place] (Bed Mobility)  supervision  -AN        Sit-Supine [place] (Bed Mobility)  supervision  -AN        Assistive Device (Bed Mobility)  bed rails;head of bed elevated  -AN           Functional Mobility    Functional Mobility- Ind. Level  contact guard assist  -AN        Functional Mobility- Device  rolling walker  -AN        Functional Mobility-Distance (Feet)  20  -AN           Transfer Assessment/Treatment    Transfer Assessment/Treatment  sit-stand transfer;stand-sit transfer;toilet transfer  -AN           Sit-Stand Transfer    Sit-Stand [place] (Transfers)  contact guard  -AN        Assistive Device (Sit-Stand Transfers)  walker, front-wheeled  -AN           Stand-Sit Transfer    Stand-Sit [place] (Transfers)  contact guard  -AN        Assistive Device (Stand-Sit Transfers)  walker, front-wheeled  -AN           Toilet Transfer    Type (Toilet Transfer)  sit-stand;stand-sit  -AN        [place] Level (Toilet Transfer)  contact guard;verbal cues  -AN        Assistive Device (Toilet Transfer)  grab bars/safety frame;walker, front-wheeled  -AN           ADL Assessment/Intervention    BADL Assessment/Intervention  toileting;grooming;feeding  -AN           Grooming Assessment/Training    [place] Level (Grooming)  wash face, hands;supervision;set up  -AN        Grooming Position  supported sitting  -AN           Self-Feeding Assessment/Training    [place] Level (Feeding)  liquids to mouth  -AN        Self-Feeding Assess/Train, Spillage Amount  minimal  -AN        Comment (Feeding)  with R hand, heavy cup; discussed adaptive equipment and not using R hand for hot liquids and heavy objects  -AN           Toileting Assessment/Training    [place] Level (Toileting)  toileting skills;supervision;set up  -AN         Toileting Position  unsupported sitting  -AN           BADL Safety/Performance    Impairments, BADL Safety/Performance  balance;strength;sensory awareness  -AN           General ROM    GENERAL ROM COMMENTS  Case UEWFL  -AN           MMT (Manual Muscle Testing)    General MMT Comments  RUE grossly 4/5, L 4+/5  -AN           Motor Assessment/Interventions    Additional Documentation  Balance (Group);Gross Motor Coordination (Group);Therapeutic Exercise (Group);Therapeutic Exercise Interventions (Group)  -AN           Therapeutic Exercise    87093 - OT Therapeutic Activity Minutes  8  -AN           Therapeutic Exercise    Comment (Therapeutic Exercise)  theraputty and foam blocks with L hand  -AN           Gross Motor Coordination    Gross Motor Skill, Impairments Detail  mild deficit R finger to nose  -AN           Balance    Balance  static sitting balance;static standing balance;dynamic sitting balance;dynamic standing balance  -AN           Static Sitting Balance    Level of [place] (Unsupported Sitting, Static Balance)  supervision  -AN        Sitting Position (Unsupported Sitting, Static Balance)  sitting on edge of bed  -AN        Time Able to Maintain Position (Unsupported Sitting, Static Balance)  4 to 5 minutes  -AN           Dynamic Sitting Balance    Level of [place], Reaches Outside Midline (Sitting, Dynamic Balance)  supervision  -AN        Sitting Position, Reaches Outside Midline (Sitting, Dynamic Balance)  other (see comments) commode  -AN        Comment, Reaches Outside Midline (Sitting, Dynamic Balance)  toilet hygeine  -AN           Static Standing Balance    Level of [place] (Supported Standing, Static Balance)  contact guard assist  -AN        Assistive Device Utilized (Supported Standing, Static Balance)  walker, rolling  -AN           Dynamic Standing Balance    Level of [place], Reaches Outside Midline (Standing, Dynamic Balance)  contact guard assist  -AN         Assistive Device Utilized (Supported Standing, Dynamic Balance)  walker, rolling  -AN        Comment, Reaches Outside Midline (Standing, Dynamic Balance)  mobility  -AN           Sensory Assessment/Intervention    Sensory General Assessment  sharp/dull sensation deficits identified  -AN        Additional Documentation  Vision Assessment/Intervention (Group)  -AN           Sharp/Dull Sensation Assessment    Left Upper Extremity: Sharp/Dull Sensation Assessment  mild impairment, 75% or more correct responses  -AN           Vision Assessment/Intervention    Visual Impairment/Limitations  legally blind since the 80's  -AN           Positioning and Restraints    Pre-Treatment Position  in bed  -AN        Post Treatment Position  bed  -AN        In Bed  supine;call light within reach;encouraged to call for assist;exit alarm on;with family/caregiver  -AN           Pain Assessment    Additional Documentation  Pain Scale: Numbers Pre/Post-Treatment (Group)  -AN           Pain Scale: Numbers Pre/Post-Treatment    Pain Scale: Numbers, Pretreatment  0/10 - no pain  -AN        Pain Scale: Numbers, Post-Treatment  0/10 - no pain  -AN           Plan of Care Review    Plan of Care Reviewed With  patient  -AN           Clinical Impression (OT)    OT Diagnosis  impaired ADLs  -AN        Patient/Family Goals Statement (OT Eval)  agreeable to OT  -AN        Criteria for Skilled Therapeutic Interventions Met (OT Eval)  yes;treatment indicated  -AN        Rehab Potential (OT Eval)  good, to achieve stated therapy goals  -AN        Therapy Frequency (OT Eval)  daily  -AN        Care Plan Review (OT)  evaluation/treatment results reviewed  -AN        Anticipated Discharge Disposition (OT)  inpatient rehabilitation facility  -AN        Patient/Family Concerns, Anticipated Discharge Disposition (OT)  pt and family want to go home. Recommended 24/7 care with txfrs and ADLs if discharge home  -AN           Vital Signs    Pre Systolic BP Rehab   148  -AN        Pre Treatment Diastolic BP  75  -AN        Post Systolic BP Rehab  149  -AN        Post Treatment Diastolic BP  69  -AN        Pretreatment Heart Rate (beats/min)  62  -AN        Posttreatment Heart Rate (beats/min)  61  -AN           OT Goals    Transfer Goal Selection (OT)  transfer, OT goal 1  -AN        Bathing Goal Selection (OT)  bathing, OT goal 1  -AN        Dressing Goal Selection (OT)  dressing, OT goal 1  -AN        Strength Goal Selection (OT)  strength, OT goal 1  -AN        Additional Documentation  Strength Goal Selection (OT) (Row)  -AN           Transfer Goal 1 (OT)    Activity/Assistive Device (Transfer Goal 1, OT)  sit-to-stand/stand-to-sit;bed-to-chair/chair-to-bed;toilet;walker, rolling  -AN        Aline Level/Cues Needed (Transfer Goal 1, OT)  supervision required  -AN        Time Frame (Transfer Goal 1, OT)  10 days  -AN        Progress/Outcome (Transfer Goal 1, OT)  goal ongoing  -AN           Bathing Goal 1 (OT)    Activity/Assistive Device (Bathing Goal 1, OT)  lower body bathing;upper body bathing;long-handled sponge;shower chair  -AN        Aline Level/Cues Needed (Bathing Goal 1, OT)  contact guard assist  -AN        Time Frame (Bathing Goal 1, OT)  10 days  -AN        Progress/Outcomes (Bathing Goal 1, OT)  goal ongoing  -AN           Dressing Goal 1 (OT)    Activity/Assistive Device (Dressing Goal 1, OT)  lower body dressing  -AN        Aline/Cues Needed (Dressing Goal 1, OT)  supervision required;set-up required  -AN        Time Frame (Dressing Goal 1, OT)  10 days  -AN        Progress/Outcome (Dressing Goal 1, OT)  goal ongoing  -AN           Strength Goal 1 (OT)    Strength Goal 1 (OT)  Pt and family will be I with UE HEP and media for increased ADL I.  -AN        Time Frame (Strength Goal 1, OT)  10 days  -AN        Progress/Outcome (Strength Goal 1, OT)  goal ongoing  -AN          User Key  (r) = Recorded By, (t) = Taken By, (c) = Cosigned By     Initials Name Effective Dates    OMAR Zohra Cheema, OT 06/22/15 -                OT Recommendation and Plan  Outcome Summary/Treatment Plan (OT)  Anticipated Discharge Disposition (OT): inpatient rehabilitation facility  Patient/Family Concerns, Anticipated Discharge Disposition (OT): pt and family want to go home. Recommended 24/7 care with txfrs and ADLs if discharge home  Therapy Frequency (OT Eval): daily  Plan of Care Review  Plan of Care Reviewed With: patient  Plan of Care Reviewed With: patient  Outcome Summary: Pt with curret evolving symptoms that have decreased ADL I. Pt is CGA for mobility , min assist ADLs due to decreased R strength/coordination/sensation. Recommend IRF, however, pt wants to go home. She will need 24/7 assist with mobility and ADLs.    Outcome Measures     Row Name 12/20/19 0825             How much help from another is currently needed...    Putting on and taking off regular lower body clothing?  2  -AN      Bathing (including washing, rinsing, and drying)  3  -AN      Toileting (which includes using toilet bed pan or urinal)  3  -AN      Putting on and taking off regular upper body clothing  3  -AN      Taking care of personal grooming (such as brushing teeth)  3  -AN      Eating meals  3  -AN      AM-PAC 6 Clicks Score (OT)  17  -AN         Functional Assessment    Outcome Measure Options  AM-PAC 6 Clicks Daily Activity (OT)  -AN        User Key  (r) = Recorded By, (t) = Taken By, (c) = Cosigned By    Initials Name Provider Type    OMAR Zohra Cheema, OT Occupational Therapist          Time Calculation:   Time Calculation- OT     Row Name 12/20/19 0957 12/20/19 0825          Time Calculation- OT    OT Start Time  0825  -AN  --     Total Timed Code Minutes- OT  8 minute(s)  -AN  --     OT Received On  12/20/19  -AN  --     OT Goal Re-Cert Due Date  12/30/19  -AN  --        Timed Charges    86584 - OT Therapeutic Activity Minutes  --  8  -AN       User Key  (r) = Recorded By, (t) =  Taken By, (c) = Cosigned By    Initials Name Provider Type    Zohra Higgins OT Occupational Therapist        Therapy Charges for Today     Code Description Service Date Service Provider Modifiers Qty    15576164720  OT THERAPEUTIC ACT EA 15 MIN 12/20/2019 Zohra Cheema OT GO 1    26325311144 HC OT THERAPEUTIC ACT EA 15 MIN 12/20/2019 Zohra Cheema OT GO 1               Zohra Cheema OT  12/20/2019

## 2019-12-20 NOTE — PLAN OF CARE
Problem: Patient Care Overview  Goal: Plan of Care Review  Flowsheets (Taken 12/20/2019 1640)  Progress: improving  Plan of Care Reviewed With: patient; daughter  Outcome Summary: Patient demonstrated ataxic gait. She ambulated 100 feet with rolling walker and contact assistance. Will need 24 hour care if going home with home health. Recommend continued skilled PT

## 2019-12-20 NOTE — PROGRESS NOTES
[facility] Medicine Services  PROGRESS NOTE    Patient Name: Leti Castañeda  : 1945  MRN: [id number]    Date of Admission: 2019  Primary Care Physician: Austyn Tee MD    Subjective   Subjective     CC:  F/u stroke    HPI:  Patient resting in bed. Continues to have right sided weakness. Discussed that MRI confirmed small stroke.    Review of Systems  Gen- No fevers, chills  CV- No chest pain, palpitations  Resp- No cough, dyspnea  GI- No N/V/D, abd pain      Objective   Objective     Vital Signs:   Temp:  [97.3 °F (36.3 °C)-97.9 °F (36.6 °C)] 97.3 °F (36.3 °C)  Heart Rate:  [56-70] 57  Resp:  [18] 18  BP: (108-194)/(66-95) 161/66  Total (NIH Stroke Scale): 2     Physical Exam:  Constitutional: No acute distress, awake, alert  HENT: NCAT, mucous membranes moist  Respiratory: Clear to auscultation bilaterally, respiratory effort normal   Cardiovascular: RRR, no murmurs, rubs, or gallops, palpable pedal pulses bilaterally  Gastrointestinal: Positive bowel sounds, soft, nontender, nondistended  Musculoskeletal: No bilateral ankle edema  Psychiatric: Appropriate affect, cooperative  Neurologic: Oriented x 3, right sided weakness with poor  strength and coordination. Cranial Nerves grossly intact to confrontation, speech clear  Skin: No rashes    Results Reviewed:    Results from last 7 days   Lab Units 19  0755   WBC 10*3/mm3 6.16   HEMOGLOBIN g/dL 14.9   HEMATOCRIT % 45.6   PLATELETS 10*3/mm3 237     Results from last 7 days   Lab Units 19  0755   SODIUM mmol/L 135*   POTASSIUM mmol/L 4.4   CHLORIDE mmol/L 96*   CO2 mmol/L 26.0   BUN mg/dL 17   CREATININE mg/dL 0.82   GLUCOSE mg/dL 136*   CALCIUM mg/dL 9.6     Estimated Creatinine Clearance: 60.4 mL/min (by C-G formula based on SCr of 0.82 mg/dL).    Microbiology Results Abnormal     None          Imaging Results (Last 24 Hours)     Procedure Component Value Units Date/Time    MRI Brain Without Contrast  [[id number]] Collected:  12/20/19 1021     Updated:  12/20/19 1023    Narrative:       EXAMINATION: MRI BRAIN WO CONTRAST-     INDICATION: stroke; right britney-ataxia; Z74.09-Other reduced mobility      TECHNIQUE: Ultrasound right the brain without intravenous contrast     COMPARISON: NONE     FINDINGS: Abnormal area of restricted diffusion within the left thalamus  consistent with acute thalamic infarction. No mass effect or midline  shift associated. Severe increased signal findings in the  periventricular and deep white matter suggesting chronic small vessel  ischemic disease. Pituitary and sella within normal limits.  Cervicomedullary junction widely patent. Globes and orbits retain normal  T2 signal characteristics. Visualized paranasal sinuses and mastoid air  cells are grossly clear and well pneumatized with the exception of a  trace right and small left mastoid effusions. No cerebellopontine angle  mass lesion. Grossly normal signal flow voids of the distal internal  carotid and vertebrobasilar arteries.          Impression:       Acute left thalamic infarction without midline shift or  hydrocephalus. Severe chronic small vessel ischemic disease findings of  increased signal aberration in the periventricular and deep white matter  additionally noted.           XR Outside Films [[id number]] Resulted:  12/20/19 0829     Updated:  12/20/19 0829    Narrative:       This procedure was auto-finalized with no dictation required.    CT Angiogram Head [[id number]] Collected:  12/19/19 2045     Updated:  12/19/19 2047    Narrative:       CTA Neck, CTA Head    INDICATION:   Right-sided facial numbness    TECHNIQUE:   CT angiogram of the head and neck with contrast. 3-D postprocessing was performed and reviewed.  Evaluation for a significant carotid arterial stenosis is based on the NASCET criteria. Radiation dose reduction techniques included automated exposure  control or exposure modulation based on body size. Radiation  audit for number of CT and nuclear cardiology exams performed in the last year:  0.      COMPARISON:   None available.    FINDINGS:     CTA neck:  There is a normal arch branching pattern without proximal great vessel stenosis. Both vertebral arteries are patent throughout the neck.    There is plaque at both cervical carotid bifurcations with 0% left and 10-20% right ICA stenosis by NASCET criteria.    CTA head:  There is no evidence of intracranial aneurysm or intracranial flow limiting stenosis.    Extravascular structures:There is no intracranial mass or abnormal enhancement. The extracranial and cervical soft tissue structures are normal. The lung apices are normal. The bony structures are unremarkable.      Impression:       Plaque at the cervical carotid bifurcations, with 0% left and 10-20% right ICA stenosis.    There is no intracranial aneurysm or flow-limiting stenosis. Otherwise negative exam.    Signer Name: Inderjit Alonso MD   Signed: 12/19/2019 8:45 PM   Workstation Name: [id number]    [facility]    CT Angiogram Neck [[id number]] Collected:  12/19/19 2045     Updated:  12/19/19 2047    Narrative:       CTA Neck, CTA Head    INDICATION:   Right-sided facial numbness    TECHNIQUE:   CT angiogram of the head and neck with contrast. 3-D postprocessing was performed and reviewed.  Evaluation for a significant carotid arterial stenosis is based on the NASCET criteria. Radiation dose reduction techniques included automated exposure  control or exposure modulation based on body size. Radiation audit for number of CT and nuclear cardiology exams performed in the last year:  0.      COMPARISON:   None available.    FINDINGS:     CTA neck:  There is a normal arch branching pattern without proximal great vessel stenosis. Both vertebral arteries are patent throughout the neck.    There is plaque at both cervical carotid bifurcations with 0% left and 10-20% right ICA stenosis by NASCET  criteria.    CTA head:  There is no evidence of intracranial aneurysm or intracranial flow limiting stenosis.    Extravascular structures:There is no intracranial mass or abnormal enhancement. The extracranial and cervical soft tissue structures are normal. The lung apices are normal. The bony structures are unremarkable.      Impression:       Plaque at the cervical carotid bifurcations, with 0% left and 10-20% right ICA stenosis.    There is no intracranial aneurysm or flow-limiting stenosis. Otherwise negative exam.    Signer Name: Inderjit Alonso MD   Signed: 12/19/2019 8:45 PM   Workstation Name: [email or web address]    [facility]               I have reviewed the medications:  Scheduled Meds:  aspirin 81 mg Oral Daily   montelukast 10 mg Oral Nightly   pantoprazole 40 mg Oral QAM AC   PARoxetine 10 mg Oral Daily   sodium chloride 10 mL Intravenous Q12H   ticagrelor 90 mg Oral BID   vitamin B-12 1,000 mcg Oral Daily     Continuous Infusions:   PRN Meds:.•  acetaminophen **OR** acetaminophen  •  bisacodyl  •  bisacodyl  •  ondansetron **OR** ondansetron  •  sennosides-docusate  •  sodium chloride      Assessment/Plan   Assessment / Plan     Active Hospital Problems    Diagnosis  POA   • Impaired mobility and ADLs [Z74.09]  Yes   • Upper extremity weakness [R29.898]  Yes   • Stroke (cerebrum) (CMS/HCC) [I63.9]  Yes   • Statin intolerance [Z78.9]  Yes   • Chronic pancreatitis (CMS/HCC) [K86.1]  Yes   • GERD (gastroesophageal reflux disease) [K21.9]  Yes   • Essential hypertension [I10]  Yes   • Dyslipidemia [E78.5]  Yes   • CAD (coronary artery disease) [I25.10]  Yes   • Cirrhosis, non-alcoholic (CMS/HCC) [K74.60]  Yes      Resolved Hospital Problems   No resolved problems to display.        Brief Hospital Course to date:  Leti Castañeda is a 74 y.o. female with PMH of GERD, HTN, IBS, CAD with stents, histoplasmosis, colon cancer. Patient presented to [facility] ED with complaints of gait instability,  numbness in right arm and leg    Acute Left thalamic Stroke  - MRI confirms left thalamic infarction, severe small vessel ischemic disease  - cont home dose of ASA 81 mg and Brillinta (cardiac stent in August 2019 at [facility])  - on Repatha at home got dose on 12/18/19 (unable to tolerate statin therapy due to liver cirrhosis and severe pancreatitis)  - CTA showing no flow limiting stenosis  - Echo pending  - Neurology following  - PT/OT/SLP - may need rehab     GERD  -- cont PPI     HTN  -- hold home meds and allow permissive HTN     HLD  -- on Repatha at home, got dose on 12/18/19     Cirrhosis  Fatty liver  -- just had recent scan and was told it was stable      DVT prophylaxis:  [facility]     Disposition: I expect the patient to be discharged TBD    CODE STATUS:   Code Status and Medical Interventions:   Ordered at: 12/19/19 2300     Level Of Support Discussed With:    Patient     Code Status:    CPR     Medical Interventions (Level of Support Prior to Arrest):    Full         Electronically signed by Magy Mckinley DO, 12/20/19, 12:59 PM.

## 2019-12-20 NOTE — PLAN OF CARE
Problem: Patient Care Overview  Goal: Plan of Care Review  Outcome: Ongoing (interventions implemented as appropriate)  Flowsheets (Taken 12/20/2019 0226)  Progress: improving  Plan of Care Reviewed With: patient  Outcome Summary: Pt A&O x4. VSS. RA. NIH: 2. Up with 1 and walker. Pt has had no complaints during shift. Pt appears to be resting well at this time. Family remains at bedside with pt. Will continue to monitor

## 2019-12-20 NOTE — PROGRESS NOTES
Discharge Planning Assessment  [facility]     Patient Name: Leti Castañeda  MRN: [id number]  Today's Date: 12/20/2019    Admit Date: 12/19/2019    Discharge Needs Assessment     Row Name 12/20/19 1612       Living Environment    Lives With  grandchild(greg)    Name(s) of Who Lives With Patient  Patient lives with adult grandson     Unique Family Situation  Balbina Ruiz (daughter) [phone]    Current Living Arrangements  home/apartment/condo    Primary Care Provided by  self    Provides Primary Care For  no one    Family Caregiver if Needed  child(greg), adult;grandchild(greg), adult    Family Caregiver Names  Balbina Ruiz (daughter) [phone]    Quality of Family Relationships  helpful;involved;supportive    Able to Return to Prior Arrangements  yes       Resource/Environmental Concerns    Resource/Environmental Concerns  none       Transition Planning    Patient/Family Anticipates Transition to  home with family    Patient/Family Anticipated Services at Transition      Transportation Anticipated  family or friend will provide       Discharge Needs Assessment    Readmission Within the Last 30 Days  no previous admission in last 30 days    Concerns to be Addressed  discharge planning    Equipment Currently Used at Home  bath bench;cane, straight;commode;glucometer;grab bar;hospital bed;lift device;ramp;rollator;shower chair;walker, rolling;wheelchair;wheelchair, motorized Patient has these supplies left from last spouse, but does not need them    Anticipated Changes Related to Illness  none    Equipment Needed After Discharge  none    Outpatient/Agency/Support Group Needs  outpatient therapy    Discharge Facility/Level of Care Needs  outpatient therapy    Provided post acute provider list?  Yes    Post Acute Provider Lists  Home Health    Post Acuite Provider List  Delivered    Delivered To  Patient    Method of Delivery  In person        Discharge Plan     Row Name 12/20/19 1617       Plan     Plan  Home with outpatient services    Patient/Family in Agreement with Plan  yes    Plan Comments  Spoke with patient at bedside.  Patient lives in [place] with grandson.  She states that she is independent with ADLs at home.  States that she has numerous pieces of DME that belonged to her spouse, but she does not need any of them.  Patient states that she is not current with home health or services, but states that she would be agreeable to outpatient PT at discharge if needed.  Orders placed in EPIC of OPPT.  Patient states that her MD is Austyn Tee MD and insurance is Medicare A and B and [employer].  She states that she has prescription drug plans and is able to afford medication.  Plans home at discharge with family to transport.  Will continue to follow for discharge planning.      Final Discharge Disposition Code  01 - home or self-care        Destination      Coordination has not been started for this encounter.      Durable Medical Equipment      Coordination has not been started for this encounter.      Dialysis/Infusion      Coordination has not been started for this encounter.      Home Medical Care      Coordination has not been started for this encounter.      Therapy      Coordination has not been started for this encounter.      Community Resources      Coordination has not been started for this encounter.        Expected Discharge Date and Time     Expected Discharge Date Expected Discharge Time    Dec 21, 2019         Demographic Summary     Row Name 12/20/19 1611       General Information    Admission Type  inpatient    Arrived From  home    Referral Source  admission list    Reason for Consult  discharge planning    Preferred Language  English     Used During This Interaction  no    General Information Comments  PCP:  Austyn Tee MD confirmed with patient        Functional Status     Row Name 12/20/19 161       Functional Status    Usual Activity Tolerance  good     Current Activity Tolerance  good       Functional Status, IADL    Medications  independent    Meal Preparation  independent    Housekeeping  independent    Laundry  independent    Shopping  independent        Psychosocial    No documentation.       Abuse/Neglect    No documentation.       Legal    No documentation.       Substance Abuse    No documentation.       Patient Forms    No documentation.           Dania Chawla RN

## 2019-12-20 NOTE — CONSULTS
"Subjective     CC: stroke    History of Present Illness   Leti Castañeda is a 74 y.o. female is seen today in consultation for stroke. She awoke yesterday morning with right sided numbness/weakness and clumsiness. Her symptoms have improved modestly overnight. There are not other associated symptoms noted by the patient or her daughter. There are no modifying factors noted.    I have reviewed and confirmed the past family, social and medical history as accurate on 12/20/19.     Review of Systems   Constitutional: Negative.    Respiratory: Negative.    Cardiovascular: Negative.    Gastrointestinal: Negative.    Genitourinary: Negative.    All other systems reviewed and are negative.      Objective   General appearance today is normal.   Peripheral pulses were present and symmetric.   The ophthalmoscopic exam today is unremarkable. This discs and posterior elements are unremarkable.    /67 (BP Location: Left arm, Patient Position: Lying)   Pulse 60   Temp 97.8 °F (36.6 °C) (Oral)   Resp 18   Ht 157.5 cm (62\")   Wt 86.2 kg (190 lb)   SpO2 94%   BMI 34.75 kg/m²     Physical Exam   Constitutional: She is oriented to person, place, and time.   Neurological: She is oriented to person, place, and time. She has an abnormal Finger-Nose-Finger Test.   Reflex Scores:       Tricep reflexes are 1+ on the right side and 1+ on the left side.       Bicep reflexes are 1+ on the right side and 1+ on the left side.       Brachioradialis reflexes are 1+ on the right side and 1+ on the left side.       Patellar reflexes are 1+ on the right side and 1+ on the left side.       Achilles reflexes are 1+ on the right side and 1+ on the left side.  Psychiatric: Her speech is normal.        Neurologic Exam     Mental Status   Oriented to person, place, and time.   Registration: recalls 3 of 3 objects. Recall at 5 minutes: recalls 2 of 3 objects. Follows 3 step commands.   Attention: normal.   Speech: speech is normal   Level of " consciousness: alert  Knowledge: good.   Normal comprehension.     Cranial Nerves   Cranial nerves II through XII intact.     Motor Exam   Muscle bulk: normal  Overall muscle tone: normalSlight right UE weakness     Sensory Exam   Slightly decreased light touch on the right     Gait, Coordination, and Reflexes     Gait  Gait: (ataxic)    Coordination   Finger to nose coordination: abnormal    Reflexes   Right brachioradialis: 1+  Left brachioradialis: 1+  Right biceps: 1+  Left biceps: 1+  Right triceps: 1+  Left triceps: 1+  Right patellar: 1+  Left patellar: 1+  Right achilles: 1+  Left achilles: 1+  Right plantar: equivocal  Left plantar: equivocal      Laboratory and radiological testing: CT/CTA's-no significant pathology reported; CMP/CBC essentially unremarkable (BUN=29);       Assessment/Plan     Leti Castañeda is admitted with suspected stroke. I suspect a lacunar syndrome, and will await an MRI to potentially confirm pathology, and ECHO to search for cardioembolic source. However, pending these tests, it may be most reasonable to simply continue her current medication regimen as directed by cardiology, which I discussed. She will also work with PT/OT. This was all discussed in detail.    As part of this visit I reviewed prior lab results, reviewed radiology results, obtained additional history from the family which is incorporated in the HPI and reviewed records from the current hospitalization which is incorporated in the HPI. Please see above for details.

## 2019-12-20 NOTE — THERAPY EVALUATION
Patient Name: Leti Castañeda  : 1945    MRN: [id number]                              Today's Date: 2019       Admit Date: 2019    Visit Dx:     ICD-10-CM ICD-9-CM   1. Impaired mobility and ADLs Z74.09 799.89     Patient Active Problem List   Diagnosis   • CAD (coronary artery disease)   • Essential hypertension   • Dyslipidemia   • GERD (gastroesophageal reflux disease)   • Cirrhosis, non-alcoholic (CMS/HCC)   • Statin intolerance   • Chronic pancreatitis (CMS/HCC)   • Upper extremity weakness   • Stroke (cerebrum) (CMS/HCC)   • Impaired mobility and ADLs     Past Medical History:   Diagnosis Date   • Abnormal ECG    • Arthritis    • Cancer (CMS/HCC)     HX OF COLON    • Cirrhosis (CMS/HCC)    • Coronary artery disease    • Depression    • Fatty liver    • GERD (gastroesophageal reflux disease)    • Histoplasmosis    • History of transfusion    • Hypertension    • IBS (irritable bowel syndrome)    • Myocardial infarction (CMS/HCC) 2019   • Osteoporosis    • Pancreatitis     WAS TOLD IN [place]. POSSIBLE HEP . B IN  THIS WAS AT A URGENT CARE IN [place]   • Right wrist sprain      Past Surgical History:   Procedure Laterality Date   • ABDOMINAL AORTA STENT      PATIENT STATES DOES NOT HAVE    • CARDIAC CATHETERIZATION     • CHOLECYSTECTOMY     • COLON SURGERY     • CORONARY ANGIOPLASTY WITH STENT PLACEMENT     • KNEE ARTHROSCOPY Right 2018    Procedure: Diagnostic arthroscopy right knee partial lateral meniscectomy;  Surgeon: Luciano Terry MD;  Location: [facility];  Service: Orthopedics   • OTHER SURGICAL HISTORY      PORTACATH REMOVAL   • PORTACATH PLACEMENT       General Information     Row Name 19 1633          PT Evaluation Time/Intention    Document Type  evaluation  -SC     Mode of Treatment  physical therapy  -SC     Row Name 19 1636          General Information    Patient Profile Reviewed?  yes  -SC     Prior Level of Function  independent:;community mobility  -SC      Existing Precautions/Restrictions  fall  -SC     Barriers to Rehab  none identified  -SC     Row Name 12/20/19 1633          Resource/Environmental Concerns    Current Living Arrangements  home/apartment/condo  -SC     Row Name 12/20/19 1633          Home Main Entrance    Number of Stairs, Main Entrance  none  -SC     Row Name 12/20/19 1633          Cognitive Assessment/Intervention- PT/OT    Orientation Status (Cognition)  oriented x 4  -SC     Personal Safety Interventions  fall prevention program maintained  -SC     Cognitive Assessment/Intervention Comment  alert, following commands  -SC     Row Name 12/20/19 1633          Safety Issues, Functional Mobility    Impairments Affecting Function (Mobility)  balance;sensation/sensory awareness;coordination  -SC       User Key  (r) = Recorded By, (t) = Taken By, (c) = Cosigned By    Initials Name Provider Type    SC Ashlie Castro PT Physical Therapist        Mobility     Row Name 12/20/19 1634          Bed Mobility Assessment/Treatment    Bed Mobility Assessment/Treatment  supine-sit;sit-supine  -SC     Supine-Sit [place] (Bed Mobility)  conditional independence  -SC     Assistive Device (Bed Mobility)  bed rails;head of bed elevated  -SC     Comment (Bed Mobility)  good technique   -SC     Row Name 12/20/19 1634          Transfer Assessment/Treatment    Comment (Transfers)  STS from bed/toiled with cues for hand placement--demonstrated good technique  -SC     Row Name 12/20/19 1634          Sit-Stand Transfer    Sit-Stand [place] (Transfers)  contact guard  -SC     Assistive Device (Sit-Stand Transfers)  walker, front-wheeled  -SC     Row Name 12/20/19 1634          Gait/Stairs Assessment/Training    Gait/Stairs Assessment/Training  gait/ambulation independence  -SC     [place] Level (Gait)  verbal cues;contact guard  -SC     Assistive Device (Gait)  walker, front-wheeled  -SC     Distance in Feet (Gait)  100  -SC     Pattern (Gait)  step-through   -SC     Deviations/Abnormal Patterns (Gait)  right sided deviations;ataxic;gait speed decreased;stride length decreased  -SC     Comment (Gait/Stairs)  Gt training focused on slowing down and controling walker. Demonstrated ataxic gait with improving technique   -SC       User Key  (r) = Recorded By, (t) = Taken By, (c) = Cosigned By    Initials Name Provider Type    SC Gloria Shearon A, PT Physical Therapist        Obj/Interventions     Row Name 12/20/19 1636          General ROM    GENERAL ROM COMMENTS  wfl  -SC     Row Name 12/20/19 1636          MMT (Manual Muscle Testing)    General MMT Comments  LE 4=/5  -SC     Row Name 12/20/19 1636          Dynamic Standing Balance    Level of [place], Reaches Outside Midline (Standing, Dynamic Balance)  contact guard assist  -SC     Time Able to Maintain Position, Reaches Outside Midline (Standing, Dynamic Balance)  more than 5 minutes  -SC     Assistive Device Utilized (Supported Standing, Dynamic Balance)  walker, rolling  -SC     Row Name 12/20/19 1636          Sensory Assessment/Intervention    Sensory General Assessment  -- +impaired sensation R UE, face, R LE.  Vision intact.  speech clear  -SC       User Key  (r) = Recorded By, (t) = Taken By, (c) = Cosigned By    Initials Name Provider Type    SC Gloria, Shearon A, PT Physical Therapist        Goals/Plan     Row Name 12/20/19 1638          Bed Mobility Goal 1 (PT)    Activity/Assistive Device (Bed Mobility Goal 1, PT)  bed mobility activities, all  -SC     Taylor Level/Cues Needed (Bed Mobility Goal 1, PT)  independent  -SC     Time Frame (Bed Mobility Goal 1, PT)  long term goal (LTG);10 days  -SC     Row Name 12/20/19 1638          Transfer Goal 1 (PT)    Activity/Assistive Device (Transfer Goal 1, PT)  sit-to-stand/stand-to-sit;bed-to-chair/chair-to-bed;walker, rolling  -SC     Taylor Level/Cues Needed (Transfer Goal 1, PT)  conditional independence  -SC     Time Frame (Transfer Goal 1, PT)  long  term goal (LTG);10 days  -SC     Row Name 12/20/19 1638          Gait Training Goal 1 (PT)    Activity/Assistive Device (Gait Training Goal 1, PT)  gait (walking locomotion);walker, rolling  -SC     [place] Level (Gait Training Goal 1, PT)  supervision required  -SC     Distance (Gait Goal 1, PT)  400  -SC     Time Frame (Gait Training Goal 1, PT)  long term goal (LTG);10 days  -SC       User Key  (r) = Recorded By, (t) = Taken By, (c) = Cosigned By    Initials Name Provider Type    SC Ashlie Castro, PT Physical Therapist        Clinical Impression     [facility] Name 12/20/19 1637          Pain Assessment    Additional Documentation  Pain Scale: FACES Pre/Post-Treatment (Group)  -[facility] Name 12/20/19 1637          Pain Scale: Numbers Pre/Post-Treatment    Pain Scale: Numbers, Pretreatment  0/10 - no pain  -SC     Pain Scale: Numbers, Post-Treatment  0/10 - no pain  -SC     Pain Intervention(s)  Repositioned  -[facility] Name 12/20/19 1637          Plan of Care Review    Plan of Care Reviewed With  patient;daughter  -[facility] Name 12/20/19 1637          Physical Therapy Clinical Impression    Criteria for Skilled Interventions Met (PT Clinical Impression)  yes;treatment indicated  -SC     Rehab Potential (PT Clinical Summary)  good, to achieve stated therapy goals  -[facility] Name 12/20/19 1637          Vital Signs    Post Systolic BP Rehab  181  -SC     Post Treatment Diastolic BP  81  -SC     Posttreatment Heart Rate (beats/min)  77  -SC     Row Name 12/20/19 1637          Positioning and Restraints    Pre-Treatment Position  in bed  -SC     Post Treatment Position  bed  -SC     In Bed  notified nsg;supine;side lying right;call light within reach;with family/caregiver  -SC       User Key  (r) = Recorded By, (t) = Taken By, (c) = Cosigned By    Initials Name Provider Type    SC Ashlie Castro, PT Physical Therapist        Outcome Measures     Row Name 12/20/19 1638          How much help from another person  do you currently need...    Turning from your back to your side while in flat bed without using bedrails?  4  -SC     Moving from lying on back to sitting on the side of a flat bed without bedrails?  4  -SC     Moving to and from a bed to a chair (including a wheelchair)?  3  -SC     Standing up from a chair using your arms (e.g., wheelchair, bedside chair)?  3  -SC     Climbing 3-5 steps with a railing?  3  -SC     To walk in hospital room?  3  -SC     AM-PAC 6 Clicks Score (PT)  20  -SC     Row Name 12/20/19 1639          Modified Andrews Scale    Modified Andrews Scale  4 - Moderately severe disability.  Unable to walk without assistance, and unable to attend to own bodily needs without assistance.  -SC     Row Name 12/20/19 1639 12/20/19 1638       Functional Assessment    Outcome Measure Options  Modified Andrews  -SC  AM-PAC 6 Clicks Basic Mobility (PT)  -SC      User Key  (r) = Recorded By, (t) = Taken By, (c) = Cosigned By    Initials Name Provider Type    SC Ashlie Castro, PT Physical Therapist          PT Recommendation and Plan     Outcome Summary/Treatment Plan (PT)  Anticipated Equipment Needs at Discharge (PT): front wheeled walker  Plan of Care Reviewed With: patient, daughter  Progress: improving  Outcome Summary: Patient demonstrated ataxic gait. She ambulated 100 feet wtih rolling walker and contact assistance. Will need 24 hour care if going home with home health. Recommend continued skilled PT     Time Calculation:   PT Charges     Row Name 12/20/19 1600             Time Calculation    Start Time  1600  -SC      PT Received On  12/20/19  -SC      PT Goal Re-Cert Due Date  12/30/19  -SC        User Key  (r) = Recorded By, (t) = Taken By, (c) = Cosigned By    Initials Name Provider Type    SC Ashlie Castro, PT Physical Therapist        Therapy Charges for Today     Code Description Service Date Service Provider Modifiers Qty    [id number]  PT EVAL MOD COMPLEXITY 3 12/20/2019 Ashlie Castro PT  GP 1          PT G-Codes  Outcome Measure Options: Modified [place]  AM-PAC 6 Clicks Score (PT): 20  AM-PAC 6 Clicks Score (OT): 17  Modified [place] Scale: 4 - Moderately severe disability.  Unable to walk without assistance, and unable to attend to own bodily needs without assistance.    Ashlie Castro, PT  12/20/2019

## 2019-12-20 NOTE — PLAN OF CARE
Problem: Stroke (Ischemic) (Adult)  Goal: Signs and Symptoms of Listed Potential Problems Will be Absent, Minimized or Managed (Stroke)  Outcome: Ongoing (interventions implemented as appropriate)  Flowsheets (Taken 12/20/2019 0825)  Problems Assessed (Stroke (Ischemic)): acute neurologic deterioration; motor/sensory impairment; cognitive impairment; muscle tone abnormal  Problems Assessed (Stroke (Ischemic)): muscle tone abnormal; motor/sensory impairment     Problem: Patient Care Overview  Goal: Plan of Care Review  Outcome: Ongoing (interventions implemented as appropriate)  Flowsheets (Taken 12/20/2019 0825)  Outcome Summary: Pt with curret evolving symptoms that have decreased ADL I. Pt is CGA for mobility , min assist ADLs due to decreased R strength/coordination/sensation. Recommend IRF, however, pt wants to go home. She will need 24/7 assist with mobility and ADLs.

## 2019-12-21 VITALS
TEMPERATURE: 98.1 F | SYSTOLIC BLOOD PRESSURE: 173 MMHG | RESPIRATION RATE: 18 BRPM | DIASTOLIC BLOOD PRESSURE: 80 MMHG | BODY MASS INDEX: 34.97 KG/M2 | HEIGHT: 62 IN | WEIGHT: 190.04 LBS | HEART RATE: 88 BPM | OXYGEN SATURATION: 93 %

## 2019-12-21 PROCEDURE — 99239 HOSP IP/OBS DSCHRG MGMT >30: CPT | Performed by: INTERNAL MEDICINE

## 2019-12-21 RX ADMIN — SODIUM CHLORIDE, PRESERVATIVE FREE 10 ML: 5 INJECTION INTRAVENOUS at 09:41

## 2019-12-21 RX ADMIN — TICAGRELOR 90 MG: 90 TABLET ORAL at 09:39

## 2019-12-21 RX ADMIN — ASPIRIN 81 MG 81 MG: 81 TABLET ORAL at 09:39

## 2019-12-21 RX ADMIN — PANTOPRAZOLE SODIUM 40 MG: 40 TABLET, DELAYED RELEASE ORAL at 05:50

## 2019-12-21 RX ADMIN — PAROXETINE HYDROCHLORIDE 10 MG: 10 TABLET, FILM COATED ORAL at 09:40

## 2019-12-21 RX ADMIN — CYANOCOBALAMIN TAB 1000 MCG 1000 MCG: 1000 TAB at 09:40

## 2019-12-21 NOTE — DISCHARGE SUMMARY
[facility] Medicine Services  DISCHARGE SUMMARY    Patient Name: Leti Castañeda  : 1945  MRN: [id number]    Date of Admission: 2019  4:47 PM  Date of Discharge:  2019  Primary Care Physician: Austyn Tee MD    Consults     Date and Time Order Name Status Description    2019 1724 Inpatient Neurology Consult Stroke Completed           Hospital Course     Presenting Problem:   Upper extremity weakness [R29.898]  Impaired mobility and ADLs [Z74.09]    Active Hospital Problems    Diagnosis  POA   • Impaired mobility and ADLs [Z74.09]  Yes   • Upper extremity weakness [R29.898]  Yes   • Stroke (cerebrum) (CMS/HCC) [I63.9]  Yes   • Statin intolerance [Z78.9]  Yes   • Chronic pancreatitis (CMS/HCC) [K86.1]  Yes   • GERD (gastroesophageal reflux disease) [K21.9]  Yes   • Essential hypertension [I10]  Yes   • Dyslipidemia [E78.5]  Yes   • CAD (coronary artery disease) [I25.10]  Yes   • Cirrhosis, non-alcoholic (CMS/HCC) [K74.60]  Yes      Resolved Hospital Problems   No resolved problems to display.          Hospital Course:  Leti Castañeda is a 74 y.o. female with PMH of GERD, HTN, IBS, CAD with stents, histoplasmosis, colon cancer. Patient presented to [facility] ED with complaints of gait instability, numbness in right arm and leg. MRI imaging showed acute left thalamic infarction with severe small vessel disease. Patient was continued on home dose ASA and brillinta. She is currently receiving Repatha via cardiology for statin intolerance. CTA was negative for flow limiting stenosis. Echo is pending at discharge, however preliminary read shows normal EF, negative bubble study. She was evaluated by neurology. She did well with PT/OT and was stable for discharge home on current medication regimen. Family to transport her home. Outpatient therapy has been arranged.    Discharge Follow Up Recommendations for labs/diagnostics:  - follow up with PCP in one week  - follow  up with Cardiology as previously scheduled    Day of Discharge     HPI:   Patient resting in bed. Reports her right hand weakness has significantly improved from yesterday. She is able to hold things better. Daughter at bedside. No other complaints. Eager to go home.    Review of Systems  Gen- No fevers, chills  CV- No chest pain, palpitations  Resp- No cough, dyspnea  GI- No N/V/D, abd pain    Otherwise ROS is negative except as mentioned in the HPI.    Vital Signs:   Temp:  [97.3 °F (36.3 °C)-98.6 °F (37 °C)] 97.6 °F (36.4 °C)  Heart Rate:  [57-87] 87  Resp:  [16-18] 18  BP: (142-172)/(66-85) 172/85     Physical Exam:  Constitutional: No acute distress, awake, alert  HENT: NCAT, mucous membranes moist  Respiratory: Clear to auscultation bilaterally, respiratory effort normal   Cardiovascular: RRR, no murmurs, rubs, or gallops, palpable pedal pulses bilaterally  Gastrointestinal: Positive bowel sounds, soft, nontender, nondistended  Musculoskeletal: No bilateral ankle edema  Psychiatric: Appropriate affect, cooperative  Neurologic: Oriented x 3, right hand weakness has significantly improved from yesterday, Cranial Nerves grossly intact to confrontation, speech clear  Skin: No rashes      Pertinent  and/or Most Recent Results     Results from last 7 days   Lab Units 12/20/19  0755   WBC 10*3/mm3 6.16   HEMOGLOBIN g/dL 14.9   HEMATOCRIT % 45.6   PLATELETS 10*3/mm3 237   SODIUM mmol/L 135*   POTASSIUM mmol/L 4.4   CHLORIDE mmol/L 96*   CO2 mmol/L 26.0   BUN mg/dL 17   CREATININE mg/dL 0.82   GLUCOSE mg/dL 136*   CALCIUM mg/dL 9.6           Invalid input(s): PROT, LABALBU  Results from last 7 days   Lab Units 12/20/19  0755   CHOLESTEROL mg/dL 110   TRIGLYCERIDES mg/dL 138   HDL CHOL mg/dL 33*     Results from last 7 days   Lab Units 12/20/19  0755   HEMOGLOBIN A1C % 6.40*       Brief Urine Lab Results     None          Microbiology Results Abnormal     None          Imaging Results (All)     Procedure Component Value  Units Date/Time    MRI Brain Without Contrast [[id number]] Collected:  12/20/19 1021     Updated:  12/20/19 1418    Narrative:       EXAMINATION: MRI BRAIN WO CONTRAST-     INDICATION: Stroke; right hemiataxia; Z74.09-Other reduced mobility.      TECHNIQUE: Multiplanar MRI of the brain without intravenous contrast.     COMPARISON: None.     FINDINGS: Abnormal area of restricted diffusion within the left thalamus  consistent with acute thalamic infarction. No mass effect or midline  shift associated.  Severe increased signal findings in the  periventricular and deep white matter suggesting chronic small vessel  ischemic disease. Pituitary and sella within normal limits.  Cervicomedullary junction widely patent. Globes and orbits retain normal  T2 signal characteristics. Visualized paranasal sinuses and mastoid air  cells are grossly clear and well pneumatized with the exception of a  trace right and small left mastoid effusions. No cerebellopontine angle  mass lesion. Grossly normal signal flow voids of the distal internal  carotid and vertebrobasilar arteries.       Impression:       Acute left thalamic infarction without midline shift or  hydrocephalus. Severe chronic small vessel ischemic disease findings of  increased signal aberration in the periventricular and deep white matter  additionally noted.      D:  12/20/2019  E:  12/20/2019     This report was finalized on 12/20/2019 2:15 PM by Dr. John Gordon.       XR Outside Films [[id number]] Resulted:  12/20/19 0829     Updated:  12/20/19 0829    Narrative:       This procedure was auto-finalized with no dictation required.    CT Angiogram Head [[id number]] Collected:  12/19/19 2045     Updated:  12/19/19 2047    Narrative:       CTA Neck, CTA Head    INDICATION:   Right-sided facial numbness    TECHNIQUE:   CT angiogram of the head and neck with contrast. 3-D postprocessing was performed and reviewed.  Evaluation for a significant carotid arterial stenosis is  based on the NASCET criteria. Radiation dose reduction techniques included automated exposure  control or exposure modulation based on body size. Radiation audit for number of CT and nuclear cardiology exams performed in the last year:  0.      COMPARISON:   None available.    FINDINGS:     CTA neck:  There is a normal arch branching pattern without proximal great vessel stenosis. Both vertebral arteries are patent throughout the neck.    There is plaque at both cervical carotid bifurcations with 0% left and 10-20% right ICA stenosis by NASCET criteria.    CTA head:  There is no evidence of intracranial aneurysm or intracranial flow limiting stenosis.    Extravascular structures:There is no intracranial mass or abnormal enhancement. The extracranial and cervical soft tissue structures are normal. The lung apices are normal. The bony structures are unremarkable.      Impression:       Plaque at the cervical carotid bifurcations, with 0% left and 10-20% right ICA stenosis.    There is no intracranial aneurysm or flow-limiting stenosis. Otherwise negative exam.    Signer Name: Inderjit Alonso MD   Signed: 12/19/2019 8:45 PM   [email or web address]    [facility]    CT Angiogram Neck [[id number]] Collected:  12/19/19 2045     Updated:  12/19/19 2047    Narrative:       CTA Neck, CTA Head    INDICATION:   Right-sided facial numbness    TECHNIQUE:   CT angiogram of the head and neck with contrast. 3-D postprocessing was performed and reviewed.  Evaluation for a significant carotid arterial stenosis is based on the NASCET criteria. Radiation dose reduction techniques included automated exposure  control or exposure modulation based on body size. Radiation audit for number of CT and nuclear cardiology exams performed in the last year:  0.      COMPARISON:   None available.    FINDINGS:     CTA neck:  There is a normal arch branching pattern without proximal great vessel stenosis. Both  vertebral arteries are patent throughout the neck.    There is plaque at both cervical carotid bifurcations with 0% left and 10-20% right ICA stenosis by NASCET criteria.    CTA head:  There is no evidence of intracranial aneurysm or intracranial flow limiting stenosis.    Extravascular structures:There is no intracranial mass or abnormal enhancement. The extracranial and cervical soft tissue structures are normal. The lung apices are normal. The bony structures are unremarkable.      Impression:       Plaque at the cervical carotid bifurcations, with 0% left and 10-20% right ICA stenosis.    There is no intracranial aneurysm or flow-limiting stenosis. Otherwise negative exam.    Signer Name: Inderjit Alonso MD   Signed: 12/19/2019 8:45 PM   [facility] Name: [email or web address]    [facility]                           Discharge Details        Discharge Medications      Changes to Medications      Instructions Start Date   valsartan-hydrochlorothiazide 160-12.5 MG per tablet  Commonly known as:  DIOVAN HCT  What changed:  when to take this   1 tablet, Oral, 2 Times Daily         Continue These Medications      Instructions Start Date   aspirin 81 MG chewable tablet   81 mg, Oral, Daily      fish oil 1000 MG capsule capsule   1,000 mg, Oral, Nightly      levocetirizine 5 MG tablet  Commonly known as:  XYZAL   5 mg, Oral, Every Evening      meclizine 25 MG tablet  Commonly known as:  ANTIVERT   25 mg, Oral, Daily PRN      metoprolol tartrate 25 MG tablet  Commonly known as:  LOPRESSOR   12.5 mg, Oral, 2 Times Daily      montelukast 10 MG tablet  Commonly known as:  SINGULAIR   10 mg, Oral, Nightly      omeprazole 20 MG capsule  Commonly known as:  priLOSEC   40 mg, Oral, Daily      PARoxetine 10 MG tablet  Commonly known as:  PAXIL   10 mg, Oral, Every Morning      REPATHA 140 MG/ML solution prefilled syringe  Generic drug:  Evolocumab   140 mg, Subcutaneous, Every 14 Days      ticagrelor 90 MG  tablet tablet  Commonly known as:  BRILINTA   90 mg, Oral, 2 Times Daily      vitamin B-12 1000 MCG tablet  Commonly known as:  CYANOCOBALAMIN   1,000 mcg, Oral, Daily      vitamin D 1.25 MG (61266 UT) capsule capsule  Commonly known as:  ERGOCALCIFEROL   50,000 Units, Oral, Weekly             Allergies   Allergen Reactions   • Codeine GI Intolerance         Discharge Disposition:  - home    Diet:  Hospital:  Diet Order   Procedures   • Diet Regular; Thin; Cardiac       Activity:   - as tolerated    Restrictions or Other Recommendations:  - none       CODE STATUS:    Code Status and Medical Interventions:   Ordered at: 12/19/19 1724     Level Of Support Discussed With:    Patient     Code Status:    CPR     Medical Interventions (Level of Support Prior to Arrest):    Full       Future Appointments   Date Time Provider Department Center   2/4/2020  1:15 PM Richa Adler MD [facility] None           Time Spent on Discharge:  45 minutes    Electronically signed by Magy Mckinley DO, 12/21/19, 11:28 AM.

## 2019-12-21 NOTE — PLAN OF CARE
Problem: Patient Care Overview  Goal: Plan of Care Review  Flowsheets (Taken 12/21/2019 0642)  Progress: improving  Plan of Care Reviewed With: patient; daughter  Outcome Summary: VSS. pt. alert and oriented. NIHS of 1. Assisted pt. to the BR. Hand and legs strength strong bilat. daughter remained at bedside. no unusualities observed on this shift.

## 2019-12-22 ENCOUNTER — READMISSION MANAGEMENT (OUTPATIENT)
Dept: CALL CENTER | Facility: HOSPITAL | Age: 74
End: 2019-12-22

## 2019-12-22 NOTE — OUTREACH NOTE
Prep Survey      Responses   Facility patient discharged from?  [facility]   Is patient eligible?  Yes   Discharge diagnosis  left thalamic infarction   Does the patient have one of the following disease processes/diagnoses(primary or secondary)?  Stroke (TIA)   Does the patient have Home health ordered?  No   Is there a DME ordered?  No   Comments regarding appointments  see AVS   Prep survey completed?  Yes          Brielle Asencio RN

## 2019-12-23 ENCOUNTER — READMISSION MANAGEMENT (OUTPATIENT)
Dept: CALL CENTER | Facility: HOSPITAL | Age: 74
End: 2019-12-23

## 2019-12-26 ENCOUNTER — DOCUMENTATION (OUTPATIENT)
Dept: SOCIAL WORK | Facility: HOSPITAL | Age: 74
End: 2019-12-26

## 2019-12-26 NOTE — OUTREACH NOTE
Case Management Call Center Follow-up      Responses   [facility] Call Center Tracking Reason?  No Script   Has the Call Center Case Management Follow-up issue been resolved?  Yes   Follow-up Comments  Outpatient PT order has been sent to [facility]. YAZAN notified pt's daughter.           LOVELY Ferrara    12/26/2019, 4:41 PM

## 2019-12-26 NOTE — DISCHARGE PLACEMENT REQUEST
"Dania Chawla   [phone]  Outpatient PT orders    Vance Castañeda (74 y.o. Female)     Date of Birth Social Security Number Address Home Phone MRN    1945  [address] [address] [phone] [id number]    Pres[employer]eterian Marital Status          Moravian        Admission Date Admission Type Admitting Provider Attending Provider Department, Room/Bed    12/19/19 Urgent Lynette Nunes MD  [facility] 3E, S345/1    Discharge Date Discharge Disposition Discharge Destination        12/21/2019 Home or Self Care              Attending Provider:  (none)   Allergies:  Codeine    Isolation:  None   Infection:  None   Code Status:  Prior    Ht:  157.5 cm (62.01\")   Wt:  86.2 kg (190 lb 0.6 oz)    Admission Cmt:  None   Principal Problem:  None                Active Insurance as of 12/19/2019     Primary Coverage     Payor Plan Insurance Group Employer/Plan Group    MEDICARE MEDICARE A & B      Payor Plan Address Payor Plan Phone Number Payor Plan Fax Number Effective Dates    [address] [phone]  3/1/1987 - None Entered    [facility] [id number]       Subscriber Name Subscriber Birth Date Member ID       VANCE CASTAÑEDA 1945 [id number]           Secondary Coverage     Payor Plan Insurance Group Employer/Plan Group           Payor Plan Address Payor Plan Phone Number Payor Plan Fax Number Effective Dates    [address] [phone]  5/9/2003 - None Entered    [place] [address]       Subscriber Name Subscriber Birth Date Member ID       VANCE CASTAÑEDA 1945 [id number]                 Emergency Contacts      (Rel.) Home Phone Work Phone Mobile Phone    Balbina Ruiz (Daughter) [phone] -- [phone]    Eulalio Castañeda (Son) -- -- [phone]        [facility] 3E  [address] [address]  Phone:  [phone]  Fax:  [phone] Date: Dec 26, 2019      Ambulatory Referral to " Physical Therapy     Patient:  Leti Castañeda MRN:  [id number]   [address] [facility] [address] :  1945  SSN:    Phone: [phone] Sex:  F      INSURANCE PAYOR PLAN GROUP # SUBSCRIBER ID   Primary:  Secondary:    MEDICARE CHAMPVA [id number]  [id number]      [id number]  [id number]      Referring Provider Information:  MELBA HINDS Phone: [phone] Fax: [phone]      Referral Information:   # Visits:  1 Referral Type: Therapy [AE1]   Urgency:  Routine Referral Reason: Specialty Services Required   Start Date: Dec 26, 2019 End Date:  To be determined by Insurer   Diagnosis: Impaired mobility and ADLs (Z74.09 [ICD-10-CM] 799.89 [ICD-9-CM])  Cerebrovascular accident (CVA), unspecified mechanism (CMS/HCC) (I63.9 [ICD-10-CM] 434.91 [ICD-9-CM])      Refer to Dept:   Refer to Provider:   Refer to Facility:             This document serves as a request of services and does not constitute Insurance authorization or approval of services.  To determine eligibility, please contact the members Insurance carrier to verify and review coverage.     If you have medical questions regarding this request for services. Please contact [address] at [phone] during normal business hours.       Verbal Order Mode: Telephone with readback   Authorizing Provider: Melba Hinds DO  Authorizing Provider's NPI: [id number]     Order Entered By: Dania Chawla RN 2019  4:12 PM                    History & Physical      Lynette Nunes MD at 19 1655              [facility] Medicine Services  HISTORY AND PHYSICAL    Patient Name: Leti Castañeda  : 1945  MRN: [id number]  Primary Care Physician: Austyn Tee MD  Date of admission: 2019      Subjective   Subjective     Chief Complaint:  Right side numbness, and gait instability     HPI:  Leti Castañeda is a 74 y.o. female with PMH of GERD, HTN, IBS, CAD with stents,  histoplasmosis, colon cancer. Patient presented to [facility] ED with complaints of gait instability, numbness in right arm and leg. Patient went to bed at 23:30 last night in normal state of health when she woke up at 9:30 with right arm and leg numbness. Patient states she tried to stand and walk and couldn't get her legs to move how she wanted. Per daughter patient was staggering when she was trying to walk and was unable to walk up stairs by herself. Patient is normally independent with ADL's and does not require help.  Patient blood pressure at home was also 220/196. Patient took a Norvasc at home.  CT scan shows old lacunar infarct. Patient was transferred to [facility] for further evaluation. Patient had a heart cath with stent placed in 8/2019 at [place]. She follows with Dr. Adler.     Labs at OSH: creat 0.8, WBC 7.5, , K 4.3, Na 135, trop 0.009, bnp 121    Review of Systems   Constitutional: Negative for chills and fever.   Respiratory: Negative for shortness of breath.    Cardiovascular: Negative for chest pain and leg swelling.   Gastrointestinal: Negative for constipation, diarrhea, nausea and vomiting.   Genitourinary: Negative for dysuria.   Musculoskeletal: Positive for gait problem.   Neurological: Positive for weakness. Negative for dizziness, speech difficulty and headaches.   Psychiatric/Behavioral: Negative for confusion.      All other systems reviewed and are negative.     Personal History     Past Medical History:   Diagnosis Date   • Abnormal ECG    • Arthritis    • Cancer (CMS/HCC)     HX OF COLON    • Cirrhosis (CMS/HCC)    • Coronary artery disease    • Depression    • Fatty liver    • GERD (gastroesophageal reflux disease)    • Histoplasmosis    • History of transfusion    • Hypertension    • IBS (irritable bowel syndrome)    • Myocardial infarction (CMS/HCC) 08/30/2019   • Osteoporosis    • Pancreatitis     WAS TOLD IN [place]. POSSIBLE HEP . B IN 2010 THIS WAS AT A [place]RGENT C[place]   • Right  wrist sprain        Past Surgical History:   Procedure Laterality Date   • ABDOMINAL AORTA STENT      PATIENT STATES DOES NOT HAVE    • CARDIAC CATHETERIZATION     • CHOLECYSTECTOMY     • COLON SURGERY     • CORONARY ANGIOPLASTY WITH STENT PLACEMENT  2001   • KNEE ARTHROSCOPY Right 5/30/2018    Procedure: Diagnostic arthroscopy right knee partial lateral meniscectomy;  Surgeon: Luciano Terry MD;  Location: [facility];  Service: Orthopedics   • OTHER SURGICAL HISTORY      PORTACATH REMOVAL   • PORTACATH PLACEMENT         Family History: family history includes Cancer in her father; Diabetes in an other family member; Hypertension in an other family member; Stroke in her mother. Otherwise pertinent FHx was reviewed and unremarkable.     Social History:  reports that she has never smoked. She has never used smokeless tobacco. She reports that she does not drink alcohol or use drugs.  Social History     Social History Narrative    Independent with adl's        Medications:  Available home medication information reviewed.  Medications Prior to Admission   Medication Sig Dispense Refill Last Dose   • Evolocumab (REPATHA) 140 MG/ML solution prefilled syringe Inject 140 mg under the skin into the appropriate area as directed 1 (One) Time Per Week.      • meclizine (ANTIVERT) 25 MG tablet Take 25 mg by mouth Daily As Needed for Dizziness.      • aspirin 81 MG chewable tablet Chew 81 mg Daily.   Taking   • levocetirizine (XYZAL) 5 MG tablet Take 5 mg by mouth Every Evening.   Taking   • metoprolol tartrate (LOPRESSOR) 25 MG tablet Take 12.5 mg by mouth 2 (Two) Times a Day.   Taking   • montelukast (SINGULAIR) 10 MG tablet Take 10 mg by mouth Every Night.   Taking   • Omega-3 Fatty Acids (FISH OIL) 1000 MG capsule capsule Take 1,000 mg by mouth Every Night.   Taking   • omeprazole (priLOSEC) 20 MG capsule Take 40 mg by mouth Daily.   Taking   • PARoxetine (PAXIL) 10 MG tablet Take 10 mg by mouth Every Morning.   Taking   •  ticagrelor (BRILINTA) 90 MG tablet tablet Take 90 mg by mouth 2 (Two) Times a Day.   Taking   • valsartan-hydrochlorothiazide (DIOVAN HCT) 160-12.5 MG per tablet Take 1 tablet by mouth 2 (Two) Times a Day. (Patient taking differently: Take 1 tablet by mouth Daily.) 60 tablet 0    • vitamin B-12 (CYANOCOBALAMIN) 1000 MCG tablet Take 1,000 mcg by mouth Daily.   Taking   • vitamin D (ERGOCALCIFEROL) 84251 units capsule capsule Take 50,000 Units by mouth 1 (One) Time Per Week.   Taking       Allergies   Allergen Reactions   • Codeine GI Intolerance       Objective   Objective     Vital Signs:   Temp:  [97.7 °F (36.5 °C)] 97.7 °F (36.5 °C)  Heart Rate:  [56-61] 61  Resp:  [18] 18  BP: (190-194)/(78-95) 190/95   Total (NIH Stroke Scale): 2    Physical Exam   Constitutional: Awake, alert  Eyes: PERRLA, sclerae anicteric, no conjunctival injection  HENT: NCAT, mucous membranes moist  Neck: Supple, trachea midline  Respiratory: Clear to auscultation bilaterally, nonlabored respirations room air 95%  Cardiovascular: RRR, no murmurs, rubs, or gallops, palpable pedal pulses bilaterally  Gastrointestinal: Positive bowel sounds, soft, nontender, nondistended  Musculoskeletal: No bilateral ankle edema, no clubbing or cyanosis to extremities  Psychiatric: Appropriate affect, cooperative  Neurologic: Oriented x 3, strength symmetric in all extremities, Cranial Nerves grossly intact to confrontation, speech clear, numbness on right side of nose, right leg and tingling in right hand. Strength equal and no drift noted  Skin: No rashes    Results Reviewed:  I have personally reviewed current lab and radiology data.              Invalid input(s):  ALKPHOS  CrCl cannot be calculated (Patient's most recent lab result is older than the maximum 30 days allowed.).  Brief Urine Lab Results     None        Imaging Results (Last 24 Hours)     ** No results found for the last 24 hours. **             Assessment/Plan   Assessment / Plan     Active  Hospital Problems    Diagnosis POA   • Upper extremity weakness [R29.898] Yes   • Stroke (cerebrum) (CMS/HCC) [I63.9] Yes   • Statin intolerance [Z78.9] Yes     Elevated liver enzymes     • GERD (gastroesophageal reflux disease) [K21.9] Yes   • Essential hypertension [I10] Yes   • Dyslipidemia [E78.5] Yes     CVA  Right sided numbness and gait instability   -- CTA of head and neck tonight  -- consult neurology  -- consult PT,OT,CM, SPeech  -- ECHO in am   -- cont home dose of ASA 81 mg and Brillinta (cardiac stent in August 2019 at [facility])  -- on Repatha at home got dose on 12/18/19 (unable to tolerate statin therapy due to liver cirrhosis and severe pancreatitis)  Case discussed with Dr. Elizondo prior to patient's arrival.  Dr. Elizondo is requesting CT angiogram of the head and neck tonight if renal function is normal, as well as MRI of the brain/rest of the stroke work-up, otherwise MRI of the brain and MRA of the head and neck/rest of the stroke work-up.    GERD  -- cont PPI    HTN  -- hold home meds and allow permissive HTN    HLD  -- check lipid panel  -- unable to tolerate statins  -- on Repatha at home got dose on 12/18/19    Cirrhosis  Fatty liver  -- just had recent scan and was told it was stable     DVT prophylaxis:  [facility]     CODE STATUS:    Code Status and Medical Interventions:   Ordered at: 12/19/19 1724     Level Of Support Discussed With:    Patient     Code Status:    CPR     Medical Interventions (Level of Support Prior to Arrest):    Full       Admission Status:  I believe this patient meets OBSERVATION status, however if further evaluation or treatment plans warrant, status may change.  Based upon current information, I predict patient's care encounter to be less than or equal to 2 midnights.    Electronically signed by ANISA Packer, 12/19/19, 4:55 PM.      Attending   Admission Attestation       I have seen and examined the patient, performing an independent  face-to-face diagnostic evaluation with plan of care reviewed and developed with the advanced practice clinician (APC).      Brief Summary Statement:   Leti Castañeda is a 74 y.o. female with past medical history significant for CAD- stent placement at [facility] in August 2019 and currently on Brilinta and aspirin 81 mg daily, hypertension, hyperlipidemia- unable to tolerate statin therapy due to pancreatitis and liver cirrhosis, and KAUFMAN/liver cirrhosis, who [facility].    Patient was known to be well last night around 11 PM when she went to bed.  She woke up this morning around 9 AM and noticed that her right arm/fingers were numb and heavy.  She also noticed some incoordination of that arm.  She got herself out of bed and noticed that she had some gait instability, reporting that her legs wanted to give out on her, especially the right leg.  She did not seek medical attention right away.  She denies any dysphagia or dysarthria.  No headache, changes in vision, dizziness, chest pain, shortness of air, abdominal pain, nausea, vomiting, diarrhea, dysuria, hematochezia, or melena. She denies any other focal neurological deficits.  Her daughter came to pick her up to go to her son's house and patient stated that she was having trouble with climbing the stairs, so her daughter brought her to [facility] acute hospital sometime after 1 PM.  Initial work-up at outside hospital was unremarkable.  Her NIH score was 3.  Dr. Elizondo was consulted and accepted the patient in transfer for further stroke work-up.      Remainder of detailed HPI is as noted by APC and has been reviewed and/or edited by me for completeness.    Attending Physical Exam:  General Assessment: No acute cardiopulmonary distress. Well developed and well nourished.    HEENT: NCAT, PERRL, MM moist    Neck: Supple, no carotid bruit bilat    CVS: RRR, S1S2 normal, no murmurs    Resp:  CTAB, no adventitious sound    Abd: soft, NT, ND, normal BS, no guarding or peritoneal signs    Ext: No edema, both calves are symmetric and NTTP    Neuro: Cranial nerves II through XII are intact.  Motor 5 out of 5 in all extremities and symmetric, no pronator drift,  strength symmetric and within normal limits, sensation blunted on the right lower extremity and right upper extremity,+ dysmetria of the right upper extremity/right lower extremity, negative Babinski's sign on the left, equivocal Babinski's sign on the right.    Skin: W/D/I. No rash.    Psych: Affect is appropriate        Brief Assessment/Plan :  See detailed assessment and plan developed with APC which I have reviewed and/or edited for completeness.    Electronically signed by Lynette Nunes MD, 19, 6:48 PM.                Electronically signed by Lynette Nunes MD at 19 1908          Discharge Summary      Magy Mckinley DO at 19 1128              [facility] Medicine Services  DISCHARGE SUMMARY    Patient Name: Leti Castañeda  : 1945  MRN: [id number]    Date of Admission: 2019  4:47 PM  Date of Discharge:  2019  Primary Care Physician: Austyn Tee MD    Consults     Date and Time Order Name Status Description    2019 1724 Inpatient Neurology Consult Stroke Completed           Hospital Course     Presenting Problem:   Upper extremity weakness [R29.898]  Impaired mobility and ADLs [Z74.09]    Active Hospital Problems    Diagnosis  POA   • Impaired mobility and ADLs [Z74.09]  Yes   • Upper extremity weakness [R29.898]  Yes   • Stroke (cerebrum) (CMS/HCC) [I63.9]  Yes   • Statin intolerance [Z78.9]  Yes   • Chronic pancreatitis (CMS/HCC) [K86.1]  Yes   • GERD (gastroesophageal reflux disease) [K21.9]  Yes   • Essential hypertension [I10]  Yes   • Dyslipidemia [E78.5]  Yes   • CAD (coronary artery disease) [I25.10]  Yes   • Cirrhosis, non-alcoholic  (CMS/[facility]) [K74.60]  Yes      Resolved Hospital Problems   No resolved problems to display.          Hospital Course:  Leti Castañeda is a 74 y.o. female with PMH of GERD, HTN, IBS, CAD with stents, histoplasmosis, colon cancer. Patient presented to OSH ED with complaints of gait instability, numbness in right arm and leg. MRI imaging showed acute left thalamic infarction with severe small vessel disease. Patient was continued on home dose ASA and brillinta. She is currently receiving Repatha via cardiology for statin intolerance. CTA was negative for flow limiting stenosis. Echo is pending at discharge, however preliminary read shows normal EF, negative bubble study. She was evaluated by neurology. She did well with PT/OT and was stable for discharge home on current medication regimen. Family to transport her home. Outpatient therapy has been arranged.    Discharge Follow Up Recommendations for labs/diagnostics:  - follow up with PCP in one week  - follow up with Cardiology as previously scheduled    Day of Discharge     HPI:   Patient resting in bed. Reports her right hand weakness has significantly improved from yesterday. She is able to hold things better. Daughter at bedside. No other complaints. Eager to go home.    Review of Systems  Gen- No fevers, chills  CV- No chest pain, palpitations  Resp- No cough, dyspnea  GI- No N/V/D, abd pain    Otherwise ROS is negative except as mentioned in the HPI.    Vital Signs:   Temp:  [97.3 °F (36.3 °C)-98.6 °F (37 °C)] 97.6 °F (36.4 °C)  Heart Rate:  [57-87] 87  Resp:  [16-18] 18  BP: (142-172)/(66-85) 172/85     Physical Exam:  Constitutional: No acute distress, awake, alert  HENT: NCAT, mucous membranes moist  Respiratory: Clear to auscultation bilaterally, respiratory effort normal   Cardiovascular: RRR, no murmurs, rubs, or gallops, palpable pedal pulses bilaterally  Gastrointestinal: Positive bowel sounds, soft, nontender, nondistended  Musculoskeletal: No bilateral  ankle edema  Psychiatric: Appropriate affect, cooperative  Neurologic: Oriented x 3, right hand weakness has significantly improved from yesterday, Cranial Nerves grossly intact to confrontation, speech clear  Skin: No rashes      Pertinent  and/or Most Recent Results     Results from last 7 days   Lab Units 12/20/19  0755   WBC 10*3/mm3 6.16   HEMOGLOBIN g/dL 14.9   HEMATOCRIT % 45.6   PLATELETS 10*3/mm3 237   SODIUM mmol/L 135*   POTASSIUM mmol/L 4.4   CHLORIDE mmol/L 96*   CO2 mmol/L 26.0   BUN mg/dL 17   CREATININE mg/dL 0.82   GLUCOSE mg/dL 136*   CALCIUM mg/dL 9.6           Invalid input(s): PROT, LABALBU  Results from last 7 days   Lab Units 12/20/19  0755   CHOLESTEROL mg/dL 110   TRIGLYCERIDES mg/dL 138   HDL CHOL mg/dL 33*     Results from last 7 days   Lab Units 12/20/19  0755   HEMOGLOBIN A1C % 6.40*       Brief Urine Lab Results     None          Microbiology Results Abnormal     None          Imaging Results (All)     Procedure Component Value Units Date/Time    MRI Brain Without Contrast [[id number]] Collected:  12/20/19 1021     Updated:  12/20/19 1418    Narrative:       EXAMINATION: MRI BRAIN WO CONTRAST-     INDICATION: Stroke; right hemiataxia; Z74.09-Other reduced mobility.      TECHNIQUE: Multiplanar MRI of the brain without intravenous contrast.     COMPARISON: None.     FINDINGS: Abnormal area of restricted diffusion within the left thalamus  consistent with acute thalamic infarction. No mass effect or midline  shift associated.  Severe increased signal findings in the  periventricular and deep white matter suggesting chronic small vessel  ischemic disease. Pituitary and sella within normal limits.  Cervicomedullary junction widely patent. Globes and orbits retain normal  T2 signal characteristics. Visualized paranasal sinuses and mastoid air  cells are grossly clear and well pneumatized with the exception of a  trace right and small left mastoid effusions. No cerebellopontine angle  mass  lesion. Grossly normal signal flow voids of the distal internal  carotid and vertebrobasilar arteries.       Impression:       Acute left thalamic infarction without midline shift or  hydrocephalus. Severe chronic small vessel ischemic disease findings of  increased signal aberration in the periventricular and deep white matter  additionally noted.      D:  12/20/2019  E:  12/20/2019     This report was finalized on 12/20/2019 2:15 PM by Dr. John Gordon.       XR Outside Films [[id number]] Resulted:  12/20/19 0829     Updated:  12/20/19 0829    Narrative:       This procedure was auto-finalized with no dictation required.    CT Angiogram Head [[id number]] Collected:  12/19/19 2045     Updated:  12/19/19 2047    Narrative:       CTA Neck, CTA Head    INDICATION:   Right-sided facial numbness    TECHNIQUE:   CT angiogram of the head and neck with contrast. 3-D postprocessing was performed and reviewed.  Evaluation for a significant carotid arterial stenosis is based on the NASCET criteria. Radiation dose reduction techniques included automated exposure  control or exposure modulation based on body size. Radiation audit for number of CT and nuclear cardiology exams performed in the last year:  0.      COMPARISON:   None available.    FINDINGS:     CTA neck:  There is a normal arch branching pattern without proximal great vessel stenosis. Both vertebral arteries are patent throughout the neck.    There is plaque at both cervical carotid bifurcations with 0% left and 10-20% right ICA stenosis by NASCET criteria.    CTA head:  There is no evidence of intracranial aneurysm or intracranial flow limiting stenosis.    Extravascular structures:There is no intracranial mass or abnormal enhancement. The extracranial and cervical soft tissue structures are normal. The lung apices are normal. The bony structures are unremarkable.      Impression:       Plaque at the cervical carotid bifurcations, with 0% left and 10-20% right ICA  stenosis.    There is no intracranial aneurysm or flow-limiting stenosis. Otherwise negative exam.    Signer Name: Inderjit Alonso MD   Signed: 12/19/2019 8:45 PM   Workstation Name: [email or web address]    [facility]    CT Angiogram Neck [[id number]] Collected:  12/19/19 2045     Updated:  12/19/19 2047    Narrative:       CTA Neck, CTA Head    INDICATION:   Right-sided facial numbness    TECHNIQUE:   CT angiogram of the head and neck with contrast. 3-D postprocessing was performed and reviewed.  Evaluation for a significant carotid arterial stenosis is based on the NASCET criteria. Radiation dose reduction techniques included automated exposure  control or exposure modulation based on body size. Radiation audit for number of CT and nuclear cardiology exams performed in the last year:  0.      COMPARISON:   None available.    FINDINGS:     CTA neck:  There is a normal arch branching pattern without proximal great vessel stenosis. Both vertebral arteries are patent throughout the neck.    There is plaque at both cervical carotid bifurcations with 0% left and 10-20% right ICA stenosis by NASCET criteria.    CTA head:  There is no evidence of intracranial aneurysm or intracranial flow limiting stenosis.    Extravascular structures:There is no intracranial mass or abnormal enhancement. The extracranial and cervical soft tissue structures are normal. The lung apices are normal. The bony structures are unremarkable.      Impression:       Plaque at the cervical carotid bifurcations, with 0% left and 10-20% right ICA stenosis.    There is no intracranial aneurysm or flow-limiting stenosis. Otherwise negative exam.    Signer Name: Inderjit Alonso MD   Signed: 12/19/2019 8:45 PM   Workstation Name: [facility]    [facility]                           Discharge Details        Discharge Medications      Changes to Medications      Instructions Start Date    valsartan-hydrochlorothiazide 160-12.5 MG per tablet  Commonly known as:  DIOVAN HCT  What changed:  when to take this   1 tablet, Oral, 2 Times Daily         Continue These Medications      Instructions Start Date   aspirin 81 MG chewable tablet   81 mg, Oral, Daily      fish oil 1000 MG capsule capsule   1,000 mg, Oral, Nightly      levocetirizine 5 MG tablet  Commonly known as:  XYZAL   5 mg, Oral, Every Evening      meclizine 25 MG tablet  Commonly known as:  ANTIVERT   25 mg, Oral, Daily PRN      metoprolol tartrate 25 MG tablet  Commonly known as:  LOPRESSOR   12.5 mg, Oral, 2 Times Daily      montelukast 10 MG tablet  Commonly known as:  SINGULAIR   10 mg, Oral, Nightly      omeprazole 20 MG capsule  Commonly known as:  priLOSEC   40 mg, Oral, Daily      PARoxetine 10 MG tablet  Commonly known as:  PAXIL   10 mg, Oral, Every Morning      REPATHA 140 MG/ML solution prefilled syringe  Generic drug:  Evolocumab   140 mg, Subcutaneous, Every 14 Days      ticagrelor 90 MG tablet tablet  Commonly known as:  BRILINTA   90 mg, Oral, 2 Times Daily      vitamin B-12 1000 MCG tablet  Commonly known as:  CYANOCOBALAMIN   1,000 mcg, Oral, Daily      vitamin D 1.25 MG (36581 UT) capsule capsule  Commonly known as:  ERGOCALCIFEROL   50,000 Units, Oral, Weekly             Allergies   Allergen Reactions   • Codeine GI Intolerance         Discharge Disposition:  - home    Diet:  Hospital:  Diet Order   Procedures   • Diet Regular; Thin; Cardiac       Activity:   - as tolerated    Restrictions or Other Recommendations:  - none       CODE STATUS:    Code Status and Medical Interventions:   Ordered at: 12/19/19 1061     Level Of Support Discussed With:    Patient     Code Status:    CPR     Medical Interventions (Level of Support Prior to Arrest):    Full       Future Appointments   Date Time Provider Department Center   2/4/2020  1:15 PM Richa Adler MD [facility] [place] None           Time Spent on Discharge:  45  minutes    Electronically signed by Magy Mckinley DO, 12/21/19, 11:28 AM.        Electronically signed by Magy Mckinley DO at 12/21/19 1216

## 2019-12-27 LAB
ASCENDING AORTA: 3 CM
BH CV ECHO MEAS - AO MAX PG (FULL): 4.9 MMHG
BH CV ECHO MEAS - AO MAX PG: 9.7 MMHG
BH CV ECHO MEAS - AO MEAN PG (FULL): 3.5 MMHG
BH CV ECHO MEAS - AO MEAN PG: 6 MMHG
BH CV ECHO MEAS - AO ROOT AREA (BSA CORRECTED): 1.6
BH CV ECHO MEAS - AO ROOT AREA: 7.2 CM^2
BH CV ECHO MEAS - AO ROOT DIAM: 3 CM
BH CV ECHO MEAS - AO V2 MAX: 155.4 CM/SEC
BH CV ECHO MEAS - AO V2 MEAN: 112 CM/SEC
BH CV ECHO MEAS - AO V2 VTI: 32.3 CM
BH CV ECHO MEAS - ASC AORTA: 3 CM
BH CV ECHO MEAS - AVA(I,A): 2.7 CM^2
BH CV ECHO MEAS - AVA(I,D): 2.7 CM^2
BH CV ECHO MEAS - AVA(V,A): 2.2 CM^2
BH CV ECHO MEAS - AVA(V,D): 2.2 CM^2
BH CV ECHO MEAS - BSA(HAYCOCK): 2 M^2
BH CV ECHO MEAS - BSA: 1.9 M^2
BH CV ECHO MEAS - BZI_BMI: 34.8 KILOGRAMS/M^2
BH CV ECHO MEAS - BZI_METRIC_HEIGHT: 157.5 CM
BH CV ECHO MEAS - BZI_METRIC_WEIGHT: 86.2 KG
BH CV ECHO MEAS - EDV(CUBED): 62.6 ML
BH CV ECHO MEAS - EDV(MOD-SP2): 39 ML
BH CV ECHO MEAS - EDV(MOD-SP4): 61 ML
BH CV ECHO MEAS - EDV(TEICH): 68.8 ML
BH CV ECHO MEAS - EF(CUBED): 71.7 %
BH CV ECHO MEAS - EF(MOD-BP): 70 %
BH CV ECHO MEAS - EF(MOD-SP2): 69.2 %
BH CV ECHO MEAS - EF(MOD-SP4): 72.1 %
BH CV ECHO MEAS - EF(TEICH): 64 %
BH CV ECHO MEAS - ESV(CUBED): 17.7 ML
BH CV ECHO MEAS - ESV(MOD-SP2): 12 ML
BH CV ECHO MEAS - ESV(MOD-SP4): 17 ML
BH CV ECHO MEAS - ESV(TEICH): 24.8 ML
BH CV ECHO MEAS - FS: 34.3 %
BH CV ECHO MEAS - IVS/LVPW: 0.9
BH CV ECHO MEAS - IVSD: 1.1 CM
BH CV ECHO MEAS - LA DIMENSION: 3.5 CM
BH CV ECHO MEAS - LA/AO: 1.1
BH CV ECHO MEAS - LAD MAJOR: 5.2 CM
BH CV ECHO MEAS - LAT PEAK E' VEL: 5.8 CM/SEC
BH CV ECHO MEAS - LATERAL E/E' RATIO: 9.4
BH CV ECHO MEAS - LV DIASTOLIC VOL/BSA (35-75): 32.6 ML/M^2
BH CV ECHO MEAS - LV MASS(C)D: 115.7 GRAMS
BH CV ECHO MEAS - LV MASS(C)DI: 61.8 GRAMS/M^2
BH CV ECHO MEAS - LV MAX PG: 4.7 MMHG
BH CV ECHO MEAS - LV MEAN PG: 2.4 MMHG
BH CV ECHO MEAS - LV SYSTOLIC VOL/BSA (12-30): 9.1 ML/M^2
BH CV ECHO MEAS - LV V1 MAX: 108.7 CM/SEC
BH CV ECHO MEAS - LV V1 MEAN: 71.2 CM/SEC
BH CV ECHO MEAS - LV V1 VTI: 27.6 CM
BH CV ECHO MEAS - LVIDD: 3.9 CM
BH CV ECHO MEAS - LVIDS: 2.6 CM
BH CV ECHO MEAS - LVLD AP2: 5.6 CM
BH CV ECHO MEAS - LVLD AP4: 6.9 CM
BH CV ECHO MEAS - LVLS AP2: 4 CM
BH CV ECHO MEAS - LVLS AP4: 5 CM
BH CV ECHO MEAS - LVOT AREA (M): 3.1 CM^2
BH CV ECHO MEAS - LVOT AREA: 3.1 CM^2
BH CV ECHO MEAS - LVOT DIAM: 2 CM
BH CV ECHO MEAS - LVPWD: 1 CM
BH CV ECHO MEAS - MED PEAK E' VEL: 4.9 CM/SEC
BH CV ECHO MEAS - MEDIAL E/E' RATIO: 11
BH CV ECHO MEAS - MV A MAX VEL: 85.7 CM/SEC
BH CV ECHO MEAS - MV DEC SLOPE: 180.9 CM/SEC^2
BH CV ECHO MEAS - MV DEC TIME: 0.35 SEC
BH CV ECHO MEAS - MV E MAX VEL: 55.9 CM/SEC
BH CV ECHO MEAS - MV E/A: 0.65
BH CV ECHO MEAS - MV MAX PG: 3.5 MMHG
BH CV ECHO MEAS - MV MEAN PG: 0.92 MMHG
BH CV ECHO MEAS - MV P1/2T MAX VEL: 69.5 CM/SEC
BH CV ECHO MEAS - MV P1/2T: 112.5 MSEC
BH CV ECHO MEAS - MV V2 MAX: 93 CM/SEC
BH CV ECHO MEAS - MV V2 MEAN: 41.3 CM/SEC
BH CV ECHO MEAS - MV V2 VTI: 32.7 CM
BH CV ECHO MEAS - MVA P1/2T LCG: 3.2 CM^2
BH CV ECHO MEAS - MVA(P1/2T): 2 CM^2
BH CV ECHO MEAS - MVA(VTI): 2.6 CM^2
BH CV ECHO MEAS - PA ACC SLOPE: 560.3 CM/SEC^2
BH CV ECHO MEAS - PA ACC TIME: 0.14 SEC
BH CV ECHO MEAS - PA MAX PG: 3.6 MMHG
BH CV ECHO MEAS - PA MEAN PG: 1.7 MMHG
BH CV ECHO MEAS - PA PR(ACCEL): 14 MMHG
BH CV ECHO MEAS - PA V2 MAX: 94.5 CM/SEC
BH CV ECHO MEAS - PA V2 MEAN: 59.7 CM/SEC
BH CV ECHO MEAS - PA V2 VTI: 16.8 CM
BH CV ECHO MEAS - RAP SYSTOLE: 8 MMHG
BH CV ECHO MEAS - RVSP: 30 MMHG
BH CV ECHO MEAS - SI(AO): 124.7 ML/M^2
BH CV ECHO MEAS - SI(CUBED): 24 ML/M^2
BH CV ECHO MEAS - SI(LVOT): 45.8 ML/M^2
BH CV ECHO MEAS - SI(MOD-SP2): 14.4 ML/M^2
BH CV ECHO MEAS - SI(MOD-SP4): 23.5 ML/M^2
BH CV ECHO MEAS - SI(TEICH): 23.5 ML/M^2
BH CV ECHO MEAS - SV(AO): 233.3 ML
BH CV ECHO MEAS - SV(CUBED): 44.8 ML
BH CV ECHO MEAS - SV(LVOT): 85.7 ML
BH CV ECHO MEAS - SV(MOD-SP2): 27 ML
BH CV ECHO MEAS - SV(MOD-SP4): 44 ML
BH CV ECHO MEAS - SV(TEICH): 44 ML
BH CV ECHO MEAS - TAPSE (>1.6): 1.7 CM2
BH CV ECHO MEAS - TR MAX PG: 22 MMHG
BH CV ECHO MEAS - TR MAX VEL: 235.3 CM/SEC
BH CV ECHO MEASUREMENTS AVERAGE E/E' RATIO: 10.45
BH CV VAS BP RIGHT ARM: NORMAL MMHG
BH CV XLRA - RV BASE: 3.3 CM
BH CV XLRA - RV LENGTH: 5.5 CM
BH CV XLRA - RV MID: 1.8 CM
BH CV XLRA - TDI S': 12.2 CM/SEC
LEFT ATRIUM VOLUME INDEX: 21.4 ML/M^2
LEFT ATRIUM VOLUME: 40 ML
LV EF 2D ECHO EST: 60 %
MAXIMAL PREDICTED HEART RATE: 145 BPM
STRESS TARGET HR: 123 BPM

## 2019-12-31 ENCOUNTER — READMISSION MANAGEMENT (OUTPATIENT)
Dept: CALL CENTER | Facility: HOSPITAL | Age: 74
End: 2019-12-31

## 2019-12-31 NOTE — OUTREACH NOTE
Stroke Week 2 Survey      Responses   Facility patient discharged from?  [place]   Does the patient have one of the following disease processes/diagnoses(primary or secondary)?  Stroke (TIA)   Week 2 attempt successful?  Yes   Call start time  1307   Call end time  1310   Discharge diagnosis  left thalamic infarction   Meds reviewed with patient/caregiver?  Yes   Is the patient taking all medications as directed (includes completed medication regime)?  Yes   Comments regarding PCP  Appointment with PCP 1/2/20   Has the patient kept scheduled appointments due by today?  N/A   Comments  Pt will start outpatient PT 1/2/20   Psychosocial issues?  No   Does the patient require any assistance with activities of daily living such as eating, bathing, dressing, walking, etc.?  Yes   ADL comments  Needs help bathing   Does the patient have any residual symptoms from stroke/TIA?  Yes   Residual symptoms comments  Pt reports she has weakness in right arm and leg   Does the patient understand the diet ordered at discharge?  Yes   What is the patient's perception of their health status since discharge?  Improving   Nursing interventions  Nurse provided patient education   Is the patient able to teach back FAST for Stroke?  Yes   Is the patient/caregiver able to teach back the risk factors for a stroke?  Physical inactivity and obesity, Sleep apnea, Illegal drug use, Excessive alcohol intake   Week 2 call completed?  Yes          Hali Sims RN

## 2020-01-09 ENCOUNTER — READMISSION MANAGEMENT (OUTPATIENT)
Dept: CALL CENTER | Facility: HOSPITAL | Age: 75
End: 2020-01-09

## 2020-01-09 NOTE — OUTREACH NOTE
Stroke Week 3 Survey      Responses   Facility patient discharged from?  [place]   Does the patient have one of the following disease processes/diagnoses(primary or secondary)?  Stroke (TIA)   Week 3 attempt successful?  No   Unsuccessful attempts  Attempt 1          Sendy Godoy RN

## 2020-01-13 ENCOUNTER — READMISSION MANAGEMENT (OUTPATIENT)
Dept: CALL CENTER | Facility: HOSPITAL | Age: 75
End: 2020-01-13

## 2020-01-13 NOTE — OUTREACH NOTE
Stroke Week 3 Survey      Responses   Facility patient discharged from?  [place]   Does the patient have one of the following disease processes/diagnoses(primary or secondary)?  Stroke (TIA)   Week 3 attempt successful?  Yes   Call start time  1446   Call end time  1449   Discharge diagnosis  left thalamic infarction   Meds reviewed with patient/caregiver?  Yes   Is the patient having any side effects they believe may be caused by any medication additions or changes?  No   Does the patient have all medications ordered at discharge?  Yes   Is the patient taking all medications as directed (includes completed medication regime)?  Yes   Does the patient have a primary care provider?   Yes   Does the patient have an appointment with their PCP within 7 days of discharge?  Greater than 7 days   What is preventing the patient from scheduling follow up appointments within 7 days of discharge?  Earlier appointment not available   Has the patient kept scheduled appointments due by today?  Yes   Has home health visited the patient within 72 hours of discharge?  N/A   Psychosocial issues?  No   Does the patient require any assistance with activities of daily living such as eating, bathing, dressing, walking, etc.?  No   Does the patient have any residual symptoms from stroke/TIA?  Yes   Does the patient understand the diet ordered at discharge?  Yes   Did the patient receive a copy of their discharge instructions?  Yes   Nursing interventions  Reviewed instructions with patient   What is the patient's perception of their health status since discharge?  Improving   Nursing interventions  Nurse provided patient education   Is the patient able to teach back FAST for Stroke?  Yes   Is the patient/caregiver able to teach back the risk factors for a stroke?  High blood pressure-goal below 120/80, Physical inactivity and obesity   Is the patient/caregiver able to teach back signs and symptoms related to disease process for when to  call PCP?  Yes   Is the patient/caregiver able to teach back signs and symptoms related to disease process for when to call 911?  Yes   Is the patient/caregiver able to teach back the hierarchy of who to call/visit for symptoms/problems? PCP, Specialist, Home health nurse, Urgent Care, ED, 911  Yes   Additional teach back comments  She is doing better daily with ADL's.  Says she has seen her DR and she feels better.   Week 3 call completed?  Yes          Sendy Godoy RN

## 2020-02-04 ENCOUNTER — OFFICE VISIT (OUTPATIENT)
Dept: CARDIOLOGY | Facility: CLINIC | Age: 75
End: 2020-02-04

## 2020-02-04 VITALS
HEIGHT: 60 IN | HEART RATE: 65 BPM | DIASTOLIC BLOOD PRESSURE: 58 MMHG | WEIGHT: 187 LBS | BODY MASS INDEX: 36.71 KG/M2 | SYSTOLIC BLOOD PRESSURE: 116 MMHG

## 2020-02-04 DIAGNOSIS — I10 ESSENTIAL HYPERTENSION: ICD-10-CM

## 2020-02-04 DIAGNOSIS — I25.10 CORONARY ARTERY DISEASE INVOLVING NATIVE CORONARY ARTERY OF NATIVE HEART WITHOUT ANGINA PECTORIS: Primary | ICD-10-CM

## 2020-02-04 DIAGNOSIS — E78.5 DYSLIPIDEMIA: ICD-10-CM

## 2020-02-04 DIAGNOSIS — Z86.73 RECENT CEREBROVASCULAR ACCIDENT (CVA): ICD-10-CM

## 2020-02-04 PROCEDURE — 99214 OFFICE O/P EST MOD 30 MIN: CPT | Performed by: INTERNAL MEDICINE

## 2020-02-04 RX ORDER — VALSARTAN AND HYDROCHLOROTHIAZIDE 320; 25 MG/1; MG/1
1 TABLET, FILM COATED ORAL DAILY
COMMUNITY

## 2020-02-04 RX ORDER — AMLODIPINE BESYLATE 5 MG/1
5 TABLET ORAL DAILY
COMMUNITY

## 2020-02-04 NOTE — PROGRESS NOTES
[facility] Cardiology    Encounter Date:2020        Patient ID: Leti Castañeda is a 75 y.o. female.  : 1945     PCP: Austyn Tee MD     Chief Complaint: Coronary Artery Disease    PROBLEM LIST:  1. Coronary artery disease:  a. Remote stent in .  b. NSTEMI/LHC, 2019, [facility]: OSITO to mid-LAD. EF 45%.  2. CVA, 2019:  a. Patient presented to [facility] ED 19 with complaints of gait instability, numbness in right arm and leg. Transferred to [facility] for higher level of care.   b. MRI, 19: Acute left thalamic infarction with severe chronic small vessel disease.   c. CTA head, 2019: 10-20% right ICA stenosis.  d. Echocardiogram, 2019: Saline test results are negative. EF 60% with mild concentric LVH. Grade I diastolic dysfunction. Trace-to-mild TR with RVSP 30 mmHg. Trace AI.  3. Hypertension.  4. Dyslipidemia:  a. Statin intolerance due to elevated liver enzymes.  5. Chronic pancreatitis.  6. GERD.  7. Non-alcoholic cirrhosis.    History of Present Illness  Patient presents today for 3 month follow-up with a history of coronary artery disease and cardiac risk factors. Since last visit, she was hospitalized at [facility] for acute CVA. She still has some right-sided numbness and shakiness, but has been improving daily. She has otherwise been feeling well overall from a cardiovascular standpoint, and denies chest pain, palpitations, shortness of breath, edema, dizziness, and syncope.      Allergies   Allergen Reactions   • Codeine GI Intolerance         Current Outpatient Medications:   •  amLODIPine (NORVASC) 10 MG tablet, Take 10 mg by mouth Daily., Disp: , Rfl:   •  aspirin 81 MG chewable tablet, Chew 81 mg Daily., Disp: , Rfl:   •  Evolocumab (REPATHA) 140 MG/ML solution prefilled syringe, Inject 140 mg under the skin into the appropriate area as directed Every 14 (Fourteen) Days., Disp: , Rfl:   •  levocetirizine (XYZAL) 5 MG tablet, Take 5 mg  by mouth Every Evening., Disp: , Rfl:   •  meclizine (ANTIVERT) 25 MG tablet, Take 25 mg by mouth Daily As Needed for Dizziness., Disp: , Rfl:   •  metoprolol tartrate (LOPRESSOR) 25 MG tablet, Take 12.5 mg by mouth 2 (Two) Times a Day., Disp: , Rfl:   •  montelukast (SINGULAIR) 10 MG tablet, Take 10 mg by mouth Every Night., Disp: , Rfl:   •  omeprazole (priLOSEC) 20 MG capsule, Take 40 mg by mouth Daily., Disp: , Rfl:   •  PARoxetine (PAXIL) 10 MG tablet, Take 10 mg by mouth Every Morning., Disp: , Rfl:   •  ticagrelor (BRILINTA) 90 MG tablet tablet, Take 90 mg by mouth 2 (Two) Times a Day., Disp: , Rfl:   •  valsartan-hydrochlorothiazide (DIOVAN-HCT) 320-25 MG per tablet, Take 1 tablet by mouth Daily., Disp: , Rfl:   •  vitamin B-12 (CYANOCOBALAMIN) 1000 MCG tablet, Take 1,000 mcg by mouth Daily., Disp: , Rfl:   •  vitamin D (ERGOCALCIFEROL) 90182 units capsule capsule, Take 50,000 Units by mouth 1 (One) Time Per Week., Disp: , Rfl:     The following portions of the patient's history were reviewed and updated as appropriate: allergies, current medications, past family history, past medical history, past social history, past surgical history and problem list.    ROS  Review of Systems   Constitution: Negative for chills, fatigue, fever, generalized weakness.   Cardiovascular: Negative for chest pain, claudication, dyspnea on exertion, leg swelling, orthopnea, palpitations, paroxysmal nocturnal dyspnea and syncope.   Respiratory: Negative for cough, shortness of breath, and wheezing.  HENT: Negative for ear pain, nosebleeds, and tinnitus.  Gastrointestinal: Negative for abdominal pain, constipation, diarrhea, nausea and vomiting.   Genitourinary: No urinary symptoms.  Musculoskeletal: Negative for muscle cramps.  Neurological: Negative for dizziness, headaches. Positive for numbness and loss of balance.  Psychiatric: Normal mental status.     All other systems reviewed and are negative.    Objective:     /58  "(BP Location: Left arm, Patient Position: Sitting)   Pulse 65   Ht 152.4 cm (60\")   Wt 84.8 kg (187 lb)   BMI 36.52 kg/m²        Physical Exam  Constitutional: Patient appears well-developed and well-nourished.   HENT: HEENT exam unremarkable.   Neck: Neck supple. No JVD present. No carotid bruits.   Cardiovascular: Normal rate, regular rhythm and normal heart sounds. No murmur heard.   2+ symmetric pulses.   Pulmonary/Chest: Breath sounds normal. Does not exhibit tenderness.   Abdominal: Abdomen benign.   Musculoskeletal: Does not exhibit edema.   Neurological: Neurological exam unremarkable.   Vitals reviewed.    Lab Review:   Lab Results   Component Value Date    GLUCOSE 136 (H) 12/20/2019    CALCIUM 9.6 12/20/2019     (L) 12/20/2019    K 4.4 12/20/2019    CO2 26.0 12/20/2019    CL 96 (L) 12/20/2019    BUN 17 12/20/2019    CREATININE 0.82 12/20/2019    EGFRIFNONA 68 12/20/2019    BCR 20.7 12/20/2019    ANIONGAP 13.0 12/20/2019      Lab Results   Component Value Date    CHOL 110 12/20/2019    TRIG 138 12/20/2019    HDL 33 (L) 12/20/2019    LDL 49 12/20/2019      Lab Results   Component Value Date    WBC 6.16 12/20/2019    HGB 14.9 12/20/2019    HCT 45.6 12/20/2019    MCV 97.9 (H) 12/20/2019     12/20/2019     Lab Results   Component Value Date    HGBA1C 6.40 (H) 12/20/2019       LFTs 05/02/2019  AST 54 (H)  ALT 63 (H)  Alk Phos 60  Alb 4.0  TB 0.7  TP 7.4  Globulin 3.4       Procedures       Assessment:      Diagnosis Plan   1. Coronary artery disease involving native coronary artery of native heart without angina pectoris  Stable and asymptomatic, continue current medications. May DC Brillinta in August 2020.   2. Essential hypertension  Well-controlled, continue current medications   3. Dyslipidemia  Well-controlled, continue Repatha.   4. Recent cerebrovascular accident (CVA)  Residual stroke symptoms, gradually improving. Continue risk factor management.     Plan:   Stable cardiac " status.  Continue current medications.   FU in 6 MO, sooner as needed.  Thank you for allowing us to participate in the care of your patient.     Scribed for Richa Adler MD by Eri Barrera. 2/4/2020  11:37 AM      I, Richa Adler MD, personally performed the services described in this documentation as scribed by the above named individual in my presence, and it is both accurate and complete.  2/4/2020  11:59 AM        Please note that portions of this note may have been completed with a voice recognition program. Efforts were made to edit the dictations, but occasionally words are mistranscribed.

## 2020-02-12 NOTE — OUTREACH NOTE
Stroke Week 1 Survey      Responses   Facility patient discharged from?  [place]   Does the patient have one of the following disease processes/diagnoses(primary or secondary)?  Stroke (TIA)   Is there a successful TCM telephone encounter documented?  No   Week 1 attempt successful?  Yes   Call start time  1533   Call end time  1536   Discharge diagnosis  left thalamic infarction   Is patient permission given to speak with other caregiver?  Yes   Person spoke with today (if not patient) and relationship  Pt and Daughter   Meds reviewed with patient/caregiver?  Yes   Is the patient having any side effects they believe may be caused by any medication additions or changes?  No   Does the patient have all medications ordered at discharge?  Yes   Is the patient taking all medications as directed (includes completed medication regime)?  Yes   Does the patient have a primary care provider?   Yes   Does the patient have an appointment with their PCP within 7 days of discharge?  Greater than 7 days   What is preventing the patient from scheduling follow up appointments within 7 days of discharge?  Earlier appointment not available   Has the patient kept scheduled appointments due by today?  Yes   Has home health visited the patient within 72 hours of discharge?  N/A   Psychosocial issues?  No   Does the patient require any assistance with activities of daily living such as eating, bathing, dressing, walking, etc.?  Yes   Does the patient have any residual symptoms from stroke/TIA?  Yes   Does the patient understand the diet ordered at discharge?  Yes   Did the patient receive a copy of their discharge instructions?  Yes   Nursing interventions  Reviewed instructions with patient   What is the patient's perception of their health status since discharge?  Improving   Nursing interventions  Nurse provided patient education   Is the patient able to teach back FAST for Stroke?  Yes   Is the patient/caregiver able to teach back  the risk factors for a stroke?  High blood pressure-goal below 120/80, High Cholesterol, Physical inactivity and obesity   Is the patient/caregiver able to teach back signs and symptoms related to disease process for when to call PCP?  Yes   Is the patient/caregiver able to teach back signs and symptoms related to disease process for when to call 911?  Yes   Is the patient/caregiver able to teach back the hierarchy of who to call/visit for symptoms/problems? PCP, Specialist, Home health nurse, Urgent Care, ED, 911  Yes   Week 1 call completed?  Yes          Tj Lyons RN   negative

## 2020-08-18 ENCOUNTER — OFFICE VISIT (OUTPATIENT)
Dept: CARDIOLOGY | Facility: CLINIC | Age: 75
End: 2020-08-18

## 2020-08-18 VITALS
SYSTOLIC BLOOD PRESSURE: 128 MMHG | BODY MASS INDEX: 33.13 KG/M2 | DIASTOLIC BLOOD PRESSURE: 72 MMHG | HEIGHT: 62 IN | HEART RATE: 61 BPM | WEIGHT: 180 LBS

## 2020-08-18 DIAGNOSIS — E78.5 DYSLIPIDEMIA: ICD-10-CM

## 2020-08-18 DIAGNOSIS — I25.10 CORONARY ARTERY DISEASE INVOLVING NATIVE CORONARY ARTERY OF NATIVE HEART WITHOUT ANGINA PECTORIS: Primary | ICD-10-CM

## 2020-08-18 DIAGNOSIS — I10 ESSENTIAL HYPERTENSION: ICD-10-CM

## 2020-08-18 PROCEDURE — 99214 OFFICE O/P EST MOD 30 MIN: CPT | Performed by: INTERNAL MEDICINE

## 2020-08-18 NOTE — PROGRESS NOTES
[facility] Cardiology    Encounter Date: 2020    Patient ID: Leti Castañeda is a 75 y.o. female.  : 1945     PCP: Austyn Tee MD       Chief Complaint: Coronary artery disease involving native coronary artery of  and Dizziness      PROBLEM LIST:  1. Coronary artery disease:  a. Remote stent in .  b. NSTEMI/LHC, 2019, [facility]: OSITO to mid-LAD. EF 45%.  2. CVA, 2019:  a. Patient presented to [facility] ED 19 with complaints of gait instability, numbness in right arm and leg. Transferred to [facility] for higher level of care.   b. MRI, 19: Acute left thalamic infarction with severe chronic small vessel disease.   c. CTA head, 2019: 10-20% right ICA stenosis.  d. Echocardiogram, 2019: Saline test results are negative. EF 60% with mild concentric LVH. Grade I diastolic dysfunction. Trace-to-mild TR with RVSP 30 mmHg. Trace AI.  3. Hypertension.  4. Dyslipidemia:  a. Statin intolerance due to elevated liver enzymes.  5. Chronic pancreatitis.  6. GERD.  7. Non-alcoholic cirrhosis.    History of Present Illness  Patient presents today for a 6 month follow-up with a history of coronary artery disease, recent CVA, and cardiac risk factors. Since last visit, she is done well from a cardiac standpoint.  He has no complaint of exertional chest pain or dyspnea, denies orthopnea, PND, claudication, lower extremity edema.  Has had no awareness of tachyarrhythmias, no dizziness or syncope.  She does not check her blood pressure regularly at home but thinks that she is having some low blood pressure.  She is complaining of periodic dizziness without syncope.  She remains on Repatha and her last lipid panel was highly favorable.  Plan with current medical regimen ports no significant adverse side effects.    Allergies   Allergen Reactions   • Codeine GI Intolerance         Current Outpatient Medications:   •  amLODIPine (NORVASC) 10 MG tablet, Take 10 mg  by mouth Daily., Disp: , Rfl:   •  aspirin 81 MG chewable tablet, Chew 81 mg Daily., Disp: , Rfl:   •  Evolocumab (REPATHA) 140 MG/ML solution prefilled syringe, Inject 140 mg under the skin into the appropriate area as directed Every 14 (Fourteen) Days., Disp: , Rfl:   •  levocetirizine (XYZAL) 5 MG tablet, Take 5 mg by mouth Every Evening., Disp: , Rfl:   •  meclizine (ANTIVERT) 25 MG tablet, Take 25 mg by mouth Daily As Needed for Dizziness., Disp: , Rfl:   •  metoprolol tartrate (LOPRESSOR) 25 MG tablet, Take 12.5 mg by mouth 2 (Two) Times a Day., Disp: , Rfl:   •  montelukast (SINGULAIR) 10 MG tablet, Take 10 mg by mouth Every Night., Disp: , Rfl:   •  omeprazole (priLOSEC) 20 MG capsule, Take 40 mg by mouth Daily., Disp: , Rfl:   •  PARoxetine (PAXIL) 10 MG tablet, Take 10 mg by mouth Every Morning., Disp: , Rfl:   •  ticagrelor (BRILINTA) 90 MG tablet tablet, Take 90 mg by mouth 2 (Two) Times a Day., Disp: , Rfl:   •  valsartan-hydrochlorothiazide (DIOVAN-HCT) 320-25 MG per tablet, Take 1 tablet by mouth Daily., Disp: , Rfl:   •  vitamin B-12 (CYANOCOBALAMIN) 1000 MCG tablet, Take 1,000 mcg by mouth Daily., Disp: , Rfl:   •  vitamin D (ERGOCALCIFEROL) 74288 units capsule capsule, Take 50,000 Units by mouth 1 (One) Time Per Week., Disp: , Rfl:     The following portions of the patient's history were reviewed and updated as appropriate: allergies, current medications, past family history, past medical history, past social history, past surgical history and problem list.    ROS  Review of Systems   Constitution: Negative for chills, fever, fatigue, generalized weakness.   Cardiovascular: Negative for chest pain, dyspnea on exertion, leg swelling, palpitations, orthopnea, and syncope.   Respiratory: Negative for cough, shortness of breath, and wheezing.  HENT: Negative for ear pain, nosebleeds, and tinnitus.  Gastrointestinal: Negative for abdominal pain, constipation, diarrhea, nausea and vomiting.  "  Genitourinary: No urinary symptoms.  Musculoskeletal: Negative for muscle cramps.  Neurological: Negative for, headaches, loss of balance, numbness, and symptoms of stroke.  Psychiatric: Normal mental status.     All other systems reviewed and are negative.        Objective:     /72 (BP Location: Right arm, Patient Position: Sitting)   Pulse 61   Ht 157.5 cm (62\")   Wt 81.6 kg (180 lb)   BMI 32.92 kg/m²      Physical Exam  Constitutional: Patient appears well-developed and well-nourished.   HENT: HEENT exam unremarkable.   Neck: Neck supple. No JVD present. No carotid bruits.   Cardiovascular: Normal rate, regular rhythm and normal heart sounds. No murmur heard.   2+ symmetric pulses.   Pulmonary/Chest: Breath sounds normal. Does not exhibit tenderness.   Abdominal: Abdomen benign.   Musculoskeletal: Does not exhibit edema.   Neurological: Neurological exam unremarkable.   Vitals reviewed.    Data Review:   Lab Results   Component Value Date    GLUCOSE 136 (H) 12/20/2019    BUN 17 12/20/2019    CREATININE 0.82 12/20/2019    EGFRIFNONA 68 12/20/2019    BCR 20.7 12/20/2019    K 4.4 12/20/2019    CO2 26.0 12/20/2019    CALCIUM 9.6 12/20/2019    ALBUMIN 4.60 05/15/2018    AST 39 05/15/2018    ALT 47 05/15/2018     Lab Results   Component Value Date    CHOL 110 12/20/2019    TRIG 138 12/20/2019    HDL 33 (L) 12/20/2019    LDL 49 12/20/2019      Lab Results   Component Value Date    WBC 6.16 12/20/2019    RBC 4.66 12/20/2019    HGB 14.9 12/20/2019    HCT 45.6 12/20/2019    MCV 97.9 (H) 12/20/2019     12/20/2019     No results found for: TSH  Lab Results   Component Value Date    HGBA1C 6.40 (H) 12/20/2019        Procedures       Assessment:      Diagnosis Plan   1. Coronary artery disease involving native coronary artery of native heart without angina pectoris   stable without current anginal symptoms, continue current medical regimen   2. Essential hypertension   reduce amlodipine to 5 mg daily   3. " Dyslipidemia   well managed on current medical regimen     Plan:     Continue current medications.   FU in 6 MO, sooner as needed.  Thank you for allowing us to participate in the care of your patient.         Electronically signed by BOBY Garcia, 08/18/20, 11:14 AM.      Please note that portions of this note may have been completed with a voice recognition program. Efforts were made to edit the dictations, but occasionally words are mistranscribed.

## 2021-03-02 ENCOUNTER — OFFICE VISIT (OUTPATIENT)
Dept: CARDIOLOGY | Facility: CLINIC | Age: 76
End: 2021-03-02

## 2021-03-02 VITALS
DIASTOLIC BLOOD PRESSURE: 78 MMHG | HEART RATE: 68 BPM | HEIGHT: 62 IN | SYSTOLIC BLOOD PRESSURE: 138 MMHG | WEIGHT: 188 LBS | BODY MASS INDEX: 34.6 KG/M2

## 2021-03-02 DIAGNOSIS — E78.5 DYSLIPIDEMIA: ICD-10-CM

## 2021-03-02 DIAGNOSIS — I25.10 CORONARY ARTERY DISEASE INVOLVING NATIVE CORONARY ARTERY OF NATIVE HEART WITHOUT ANGINA PECTORIS: Primary | ICD-10-CM

## 2021-03-02 DIAGNOSIS — I10 ESSENTIAL HYPERTENSION: ICD-10-CM

## 2021-03-02 PROCEDURE — 99214 OFFICE O/P EST MOD 30 MIN: CPT | Performed by: INTERNAL MEDICINE

## 2021-03-02 RX ORDER — ALENDRONATE SODIUM 70 MG/1
70 TABLET ORAL
COMMUNITY

## 2021-03-02 RX ORDER — ANASTROZOLE 1 MG/1
1 TABLET ORAL DAILY
COMMUNITY

## 2021-03-02 RX ORDER — PHENOL 1.4 %
600 AEROSOL, SPRAY (ML) MUCOUS MEMBRANE DAILY
COMMUNITY

## 2021-03-02 NOTE — PROGRESS NOTES
[facility] Cardiology    Encounter Date: 2021    Patient ID: Leti Castañeda is a 76 y.o. female.  : 1945     PCP: Austyn Tee MD       Chief Complaint: Coronary Artery Disease      PROBLEM LIST:  1. Coronary artery disease:  a. Remote stent in .  b. NSTEMI/LHC, 2019, [facility]: OSITO to mid-LAD. EF 45%.  2. CVA, 2019:  a. Patient presented to [facility] ED 19 with complaints of gait instability, numbness in right arm and leg. Transferred to [facility] for higher level of care.   b. MRI, 19: Acute left thalamic infarction with severe chronic small vessel disease.   c. CTA head, 2019: 10-20% right ICA stenosis.  d. Echocardiogram, 2019: Saline test results are negative. EF 60% with mild concentric LVH. Grade I diastolic dysfunction. Trace-to-mild TR with RVSP 30 mmHg. Trace AI.  3. Hypertension.  4. Dyslipidemia:  a. Statin intolerance due to elevated liver enzymes.  5. Chronic pancreatitis.  6. GERD.  7. Non-alcoholic cirrhosis.  8. Breast cancer  a. S/p right lumpectomy 2021.    History of Present Illness  Patient presents today for a 6 month follow-up with a history of coronary artery disease and cardiac risk factors. Since last visit, she has been feeling well overall from a cardiovascular standpoint. He has not been exercising but she has a treadmill at home that she can start using. She recently got a tablet and spends a lot of her time on it. The patient was reportedly diagnosed with breast cancer in 2021 and had a right lumpectomy. She did not need any additional treatment. Patient otherwise denies chest pain, shortness of breath, PND, edema, palpitations, syncope, or presyncope at this time.      Allergies   Allergen Reactions   • Codeine GI Intolerance         Current Outpatient Medications:   •  alendronate (FOSAMAX) 70 MG tablet, Take 70 mg by mouth Every 7 (Seven) Days., Disp: , Rfl:   •  amLODIPine (NORVASC) 5 MG  tablet, Take 5 mg by mouth Daily., Disp: , Rfl:   •  anastrozole (ARIMIDEX) 1 MG tablet, Take 1 mg by mouth Daily., Disp: , Rfl:   •  aspirin 81 MG chewable tablet, Chew 81 mg Daily., Disp: , Rfl:   •  calcium carbonate (OS-SHERRI) 600 MG tablet, Take 600 mg by mouth Daily., Disp: , Rfl:   •  Evolocumab (REPATHA) 140 MG/ML solution prefilled syringe, Inject 140 mg under the skin into the appropriate area as directed Every 14 (Fourteen) Days., Disp: , Rfl:   •  levocetirizine (XYZAL) 5 MG tablet, Take 5 mg by mouth Every Evening., Disp: , Rfl:   •  meclizine (ANTIVERT) 25 MG tablet, Take 25 mg by mouth Daily As Needed for Dizziness., Disp: , Rfl:   •  metoprolol tartrate (LOPRESSOR) 25 MG tablet, Take 12.5 mg by mouth 2 (Two) Times a Day., Disp: , Rfl:   •  montelukast (SINGULAIR) 10 MG tablet, Take 10 mg by mouth Every Night., Disp: , Rfl:   •  omeprazole (priLOSEC) 20 MG capsule, Take 40 mg by mouth Daily., Disp: , Rfl:   •  PARoxetine (PAXIL) 10 MG tablet, Take 10 mg by mouth Every Morning., Disp: , Rfl:   •  Secukinumab (COSENTYX SC), Inject 300 mg under the skin into the appropriate area as directed Every 28 (Twenty-Eight) Days., Disp: , Rfl:   •  ticagrelor (BRILINTA) 90 MG tablet tablet, Take 90 mg by mouth 2 (Two) Times a Day., Disp: , Rfl:   •  valsartan-hydrochlorothiazide (DIOVAN-HCT) 320-25 MG per tablet, Take 1 tablet by mouth Daily., Disp: , Rfl:   •  vitamin B-12 (CYANOCOBALAMIN) 1000 MCG tablet, Take 1,000 mcg by mouth Daily., Disp: , Rfl:   •  vitamin D (ERGOCALCIFEROL) 73759 units capsule capsule, Take 50,000 Units by mouth 1 (One) Time Per Week., Disp: , Rfl:     The following portions of the patient's history were reviewed and updated as appropriate: allergies, current medications, past family history, past medical history, past social history, past surgical history and problem list.    ROS  Review of Systems   Constitution: Negative for chills, fever, fatigue, generalized weakness.   Cardiovascular:  "Negative for chest pain, dyspnea on exertion, leg swelling, palpitations, orthopnea, and syncope.   Respiratory: Negative for cough, shortness of breath, and wheezing.  HENT: Negative for ear pain, nosebleeds, and tinnitus.  Gastrointestinal: Negative for abdominal pain, constipation, diarrhea, nausea and vomiting.   Genitourinary: No urinary symptoms.  Musculoskeletal: Negative for muscle cramps.  Neurological: Negative for dizziness, headaches, loss of balance, numbness, and symptoms of stroke.  Psychiatric: Normal mental status.     All other systems reviewed and are negative.        Objective:     /78 (BP Location: Left arm, Patient Position: Sitting)   Pulse 68   Ht 157.5 cm (62\")   Wt 85.3 kg (188 lb)   BMI 34.39 kg/m²      Physical Exam  Constitutional: Patient appears well-developed and well-nourished. Bruising.  HENT: HEENT exam unremarkable.   Neck: Neck supple. No JVD present. No carotid bruits.   Cardiovascular: Normal rate, regular rhythm and normal heart sounds. No murmur heard.   2+ symmetric pulses.   Pulmonary/Chest: Breath sounds normal. Does not exhibit tenderness.   Abdominal: Abdomen benign.   Musculoskeletal: Does not exhibit edema.   Neurological: Neurological exam unremarkable.   Vitals reviewed.    Data Review:   Lab Results   Component Value Date    GLUCOSE 136 (H) 12/20/2019    BUN 17 12/20/2019    CREATININE 0.82 12/20/2019    EGFRIFNONA 68 12/20/2019    BCR 20.7 12/20/2019    K 4.4 12/20/2019    CO2 26.0 12/20/2019    CALCIUM 9.6 12/20/2019     Lab Results   Component Value Date    CHOL 110 12/20/2019    TRIG 138 12/20/2019    HDL 33 (L) 12/20/2019    LDL 49 12/20/2019      Lab Results   Component Value Date    WBC 6.16 12/20/2019    RBC 4.66 12/20/2019    HGB 14.9 12/20/2019    HCT 45.6 12/20/2019    MCV 97.9 (H) 12/20/2019     12/20/2019     Lab Results   Component Value Date    HGBA1C 6.40 (H) 12/20/2019        Procedures       Assessment:      Diagnosis Plan   1. " Coronary artery disease involving native coronary artery of native heart without angina pectoris  Stable with no current angina; continue aspirin 81 mg and discontinue Brilinta after finishing current bottle. Begin routine aerobic exercise.   2. Essential hypertension  Well-controlled; continue amlodipine 5 mg daily, metoprolol tartrate 25 mg BID, and valsartan-HCTZ 320-25 mg daily.    3. Dyslipidemia  Well-controlled; continue Repatha 140 mg/ml every 14 days.     Plan:   May discontinue Brilinta after finishing current bottle.   Begin routine aerobic exercise.  Continue all other current medications.   FU in 6 MO, sooner as needed.  Advance care planning discussed with ANAHI Tomlin.  Thank you for allowing us to participate in the care of your patient.     Scribed for Richa Adler MD by Joanna Kraft. 3/2/2021  13:40 EST      I, Richa Adler MD, personally performed the services described in this documentation as scribed by the above named individual in my presence, and it is both accurate and complete.  3/2/2021  13:49 EST      Please note that portions of this note may have been completed with a voice recognition program. Efforts were made to edit the dictations, but occasionally words are mistranscribed.

## 2022-03-07 NOTE — PROGRESS NOTES
[facility] Cardiology    Encounter Date: 2022    Patient ID: Leti Castañeda is a 77 y.o. female.  : 1945     PCP: Austyn Tee MD       Chief Complaint: Coronary Artery Disease      PROBLEM LIST:  1. Coronary artery disease:  a. Remote stent in .  b. NSTEMI/LHC, 2019, [facility]: OSITO to mid-LAD. EF 45%.  2. CVA, 2019:  a. Patient presented to [facility] ED 19 with complaints of gait instability, numbness in right arm and leg. Transferred to [facility] for higher level of care.   b. MRI, 19: Acute left thalamic infarction with severe chronic small vessel disease.   c. CTA head, 2019: 10-20% right ICA stenosis.  d. Echocardiogram, 2019: Saline test results are negative. EF 60% with mild concentric LVH. Grade I diastolic dysfunction. Trace-to-mild TR with RVSP 30 mmHg. Trace AI.  3. Hypertension.  4. Dyslipidemia:  a. Statin intolerance due to elevated liver enzymes  b. On Repatha.  5. Chronic pancreatitis.  6. GERD.  7. Non-alcoholic cirrhosis.  8. Breast cancer  a. S/p right lumpectomy 2021.    History of Present Illness  Patient presents today for a 12 month follow-up with a history of coronary artery disease and cardiac risk factors. Since last visit, she has done well from a cardiac standpoint.  She denies chest pain, shortness of breath, edema, palpitations or syncope.  She states she has gained a few pounds over the last few months.  She performs no regular exercise.  She is not checking her blood pressure at home. She has not had a recent lipid panel, but will have it done when she sees Dr. Tee in a couple of weeks.    Allergies   Allergen Reactions   • Codeine GI Intolerance       Current Outpatient Medications:   •  alendronate (FOSAMAX) 70 MG tablet, Take 70 mg by mouth Every 7 (Seven) Days., Disp: , Rfl:   •  amLODIPine (NORVASC) 5 MG tablet, Take 5 mg by mouth Daily., Disp: , Rfl:   •  anastrozole (ARIMIDEX) 1 MG tablet,  Take 1 mg by mouth Daily., Disp: , Rfl:   •  aspirin 81 MG chewable tablet, Chew 81 mg Daily., Disp: , Rfl:   •  calcium carbonate (OS-SHERRI) 600 MG tablet, Take 600 mg by mouth Daily., Disp: , Rfl:   •  clobetasol (CLOBEX) 0.05 % lotion, Apply  topically to the appropriate area as directed 2 (Two) Times a Day., Disp: , Rfl:   •  Evolocumab (REPATHA) solution prefilled syringe injection, Inject 140 mg under the skin into the appropriate area as directed Every 14 (Fourteen) Days., Disp: , Rfl:   •  levocetirizine (XYZAL) 5 MG tablet, Take 5 mg by mouth Every Evening., Disp: , Rfl:   •  meclizine (ANTIVERT) 25 MG tablet, Take 25 mg by mouth Daily As Needed for Dizziness., Disp: , Rfl:   •  metoprolol tartrate (LOPRESSOR) 25 MG tablet, Take 12.5 mg by mouth 2 (Two) Times a Day., Disp: , Rfl:   •  montelukast (SINGULAIR) 10 MG tablet, Take 10 mg by mouth Every Night., Disp: , Rfl:   •  omeprazole (priLOSEC) 20 MG capsule, Take 40 mg by mouth Daily., Disp: , Rfl:   •  PARoxetine (PAXIL) 10 MG tablet, Take 10 mg by mouth Every Morning., Disp: , Rfl:   •  Risankizumab-rzaa (Skyrizi Pen) 150 MG/ML solution auto-injector, Inject  under the skin into the appropriate area as directed Every 3 (Three) Months., Disp: , Rfl:   •  valsartan-hydrochlorothiazide (DIOVAN-HCT) 320-25 MG per tablet, Take 1 tablet by mouth Daily., Disp: , Rfl:   •  vitamin B-12 (CYANOCOBALAMIN) 1000 MCG tablet, Take 1,000 mcg by mouth Daily., Disp: , Rfl:   •  vitamin D (ERGOCALCIFEROL) 66788 units capsule capsule, Take 50,000 Units by mouth 1 (One) Time Per Week., Disp: , Rfl:     The following portions of the patient's history were reviewed and updated as appropriate: allergies, current medications, past family history, past medical history, past social history, past surgical history and problem list.    ROS  Review of Systems   Constitution: Negative for chills, fever, fatigue, generalized weakness.   Cardiovascular: Negative for chest pain, dyspnea on  "exertion, leg swelling, palpitations, orthopnea, and syncope.   Respiratory: Negative for cough, shortness of breath, and wheezing.  HENT: Negative for ear pain, nosebleeds, and tinnitus.  Gastrointestinal: Negative for abdominal pain, constipation, diarrhea, nausea and vomiting.   Genitourinary: No urinary symptoms.  Musculoskeletal: Negative for muscle cramps.  Neurological: Negative for dizziness, headaches, loss of balance, numbness, and symptoms of stroke.  Psychiatric: Normal mental status.     All other systems reviewed and are negative.        Objective:     /70 (BP Location: Left arm, Patient Position: Sitting)   Pulse 61   Ht 157.5 cm (62\")   Wt 87.9 kg (193 lb 12.8 oz)   SpO2 94%   BMI 35.45 kg/m²    Rechecked  Physical Exam  Constitutional: Patient appears well-developed and well-nourished.   HENT: HEENT exam unremarkable.   Neck: Neck supple. No JVD present. No carotid bruits.   Cardiovascular: Normal rate, regular rhythm and normal heart sounds. No murmur heard.   2+ symmetric pulses.   Pulmonary/Chest: Breath sounds normal. Does not exhibit tenderness.   Abdominal: Abdomen benign.   Musculoskeletal: Does not exhibit edema.   Neurological: Neurological exam unremarkable.   Vitals reviewed.    Data Review:     Lab Results   Component Value Date    CHOL 110 12/20/2019    TRIG 138 12/20/2019    HDL 33 (L) 12/20/2019    LDL 49 12/20/2019         Procedures       Assessment:      Diagnosis Plan   1. Coronary artery disease involving native coronary artery of native heart without angina pectoris  Stable- continue ASA 81 mg po daily   2. Essential hypertension  Borderline control- D/C Metoprolol and change to Coreg 12.5 mg po bid.   3. Dyslipidemia  Continue Repatha- FLP in 3 weeks with Dr. Tee   4. Severe obesity (BMI 35.0-39.9) with comorbidity (HCC)  Exercise for 30 minutes daily     Plan:   Discontinue Lopressor due to suboptimal blood pressure control and change to Coreg 12.5 mg po " bid.  Continue all other current medications.   Restrict salt/sodium consumption.  Daily exercise recommended.  FU in 6 MO, sooner as needed.  Thank you for allowing us to participate in the care of your patient.     Scribed for Richa Adler MD by Monse Bacon RN. 3/8/2022  11:27 EST    I, Richa Adler MD, personally performed the services described in this documentation as scribed by the above named individual in my presence, and it is both accurate and complete.  3/8/2022  12:48 EST        Part of this note may be an electronic transcription/translation of spoken language to printed text using the Dragon Dictation System.

## 2022-03-08 ENCOUNTER — OFFICE VISIT (OUTPATIENT)
Dept: CARDIOLOGY | Facility: CLINIC | Age: 77
End: 2022-03-08

## 2022-03-08 VITALS
HEIGHT: 62 IN | HEART RATE: 61 BPM | SYSTOLIC BLOOD PRESSURE: 155 MMHG | DIASTOLIC BLOOD PRESSURE: 70 MMHG | WEIGHT: 193.8 LBS | OXYGEN SATURATION: 94 % | BODY MASS INDEX: 35.66 KG/M2

## 2022-03-08 DIAGNOSIS — I25.10 CORONARY ARTERY DISEASE INVOLVING NATIVE CORONARY ARTERY OF NATIVE HEART WITHOUT ANGINA PECTORIS: Primary | ICD-10-CM

## 2022-03-08 DIAGNOSIS — I10 ESSENTIAL HYPERTENSION: ICD-10-CM

## 2022-03-08 DIAGNOSIS — E78.5 DYSLIPIDEMIA: ICD-10-CM

## 2022-03-08 DIAGNOSIS — E66.01 SEVERE OBESITY (BMI 35.0-39.9) WITH COMORBIDITY: ICD-10-CM

## 2022-03-08 PROBLEM — C80.1 CANCER: Status: ACTIVE | Noted: 2022-03-08

## 2022-03-08 PROCEDURE — 99214 OFFICE O/P EST MOD 30 MIN: CPT | Performed by: INTERNAL MEDICINE

## 2022-03-08 RX ORDER — CLOBETASOL PROPIONATE 0.5 MG/ML
LOTION TOPICAL 2 TIMES DAILY
COMMUNITY

## 2022-03-08 RX ORDER — CARVEDILOL 12.5 MG/1
12.5 TABLET ORAL 2 TIMES DAILY
Qty: 180 TABLET | Refills: 3 | Status: SHIPPED | OUTPATIENT
Start: 2022-03-08

## 2022-03-08 RX ORDER — RISANKIZUMAB-RZAA 150 MG/ML
INJECTION SUBCUTANEOUS
COMMUNITY
End: 2022-09-20

## 2022-09-20 ENCOUNTER — OFFICE VISIT (OUTPATIENT)
Dept: CARDIOLOGY | Facility: CLINIC | Age: 77
End: 2022-09-20

## 2022-09-20 VITALS
SYSTOLIC BLOOD PRESSURE: 122 MMHG | WEIGHT: 186.8 LBS | BODY MASS INDEX: 34.37 KG/M2 | DIASTOLIC BLOOD PRESSURE: 72 MMHG | OXYGEN SATURATION: 92 % | HEIGHT: 62 IN | HEART RATE: 53 BPM

## 2022-09-20 DIAGNOSIS — I10 ESSENTIAL HYPERTENSION: ICD-10-CM

## 2022-09-20 DIAGNOSIS — I25.10 CORONARY ARTERY DISEASE INVOLVING NATIVE CORONARY ARTERY OF NATIVE HEART WITHOUT ANGINA PECTORIS: Primary | ICD-10-CM

## 2022-09-20 DIAGNOSIS — E66.01 SEVERE OBESITY (BMI 35.0-39.9) WITH COMORBIDITY: ICD-10-CM

## 2022-09-20 DIAGNOSIS — E78.5 DYSLIPIDEMIA: ICD-10-CM

## 2022-09-20 PROCEDURE — 99214 OFFICE O/P EST MOD 30 MIN: CPT | Performed by: INTERNAL MEDICINE

## 2022-09-20 PROCEDURE — 93000 ELECTROCARDIOGRAM COMPLETE: CPT | Performed by: INTERNAL MEDICINE

## 2022-09-20 NOTE — PROGRESS NOTES
[facility] Cardiology    Encounter Date: 2022    Patient ID: Leti Castañeda is a 77 y.o. female.  : 1945     PCP: Austyn Tee MD       Chief Complaint: Coronary Artery Disease      PROBLEM LIST:  1. Coronary artery disease:  a. Remote stent in .  b. NSTEMI/LHC, 2019, [facility]: OSITO to mid-LAD. EF 45%.  2. CVA, 2019:  a. Patient presented to [facility] ED 19 with complaints of gait instability, numbness in right arm and leg. Transferred to [facility] for higher level of care.   b. MRI, 19: Acute left thalamic infarction with severe chronic small vessel disease.   c. CTA head, 2019: 10-20% right ICA stenosis.  d. Echocardiogram, 2019: Saline test results are negative. EF 60% with mild concentric LVH. Grade I diastolic dysfunction. Trace-to-mild TR with RVSP 30 mmHg. Trace AI.  3. Hypertension.  4. Dyslipidemia:  a. Statin intolerance due to elevated liver enzymes  b. On Repatha.  5. Chronic pancreatitis.  6. GERD.  7. Non-alcoholic cirrhosis.  8. Breast cancer  a. S/p right lumpectomy 2021.    History of Present Illness  Patient presents today for a 6 month follow-up with a history of coronary artery disease and cardiac risk factors. Since last visit, patient states that her PCP discontinued repatha and initiated Leqvio so it would be easier for her to take, she has taken 2 injections and has not had a follow-up blood test.  Patient does not have a regular exercise routine. Patient denies chest pain, shortness of breath, orthopnea, palpitations, edema, dizziness, and syncope.        Allergies   Allergen Reactions   • Codeine GI Intolerance         Current Outpatient Medications:   •  alendronate (FOSAMAX) 70 MG tablet, Take 70 mg by mouth Every 7 (Seven) Days., Disp: , Rfl:   •  amLODIPine (NORVASC) 5 MG tablet, Take 5 mg by mouth Daily., Disp: , Rfl:   •  anastrozole (ARIMIDEX) 1 MG tablet, Take 1 mg by mouth Daily., Disp: , Rfl:    •  aspirin 81 MG chewable tablet, Chew 81 mg Daily., Disp: , Rfl:   •  calcium carbonate (OS-SHERRI) 600 MG tablet, Take 600 mg by mouth Daily., Disp: , Rfl:   •  carvedilol (COREG) 12.5 MG tablet, Take 1 tablet by mouth 2 (Two) Times a Day., Disp: 180 tablet, Rfl: 3  •  clobetasol (CLOBEX) 0.05 % lotion, Apply  topically to the appropriate area as directed 2 (Two) Times a Day., Disp: , Rfl:   •  Guselkumab (TREMFYA SC), Inject  under the skin into the appropriate area as directed Every 30 (Thirty) Days., Disp: , Rfl:   •  Inclisiran Sodium (LEQVIO SC), Inject  under the skin into the appropriate area as directed Every 6 (Six) Months., Disp: , Rfl:   •  levocetirizine (XYZAL) 5 MG tablet, Take 5 mg by mouth Every Evening., Disp: , Rfl:   •  meclizine (ANTIVERT) 25 MG tablet, Take 25 mg by mouth Daily As Needed for Dizziness., Disp: , Rfl:   •  montelukast (SINGULAIR) 10 MG tablet, Take 10 mg by mouth Every Night., Disp: , Rfl:   •  omeprazole (priLOSEC) 20 MG capsule, Take 40 mg by mouth Daily., Disp: , Rfl:   •  PARoxetine (PAXIL) 10 MG tablet, Take 10 mg by mouth Every Morning., Disp: , Rfl:   •  valsartan-hydrochlorothiazide (DIOVAN-HCT) 320-25 MG per tablet, Take 1 tablet by mouth Daily., Disp: , Rfl:   •  vitamin B-12 (CYANOCOBALAMIN) 1000 MCG tablet, Take 1,000 mcg by mouth Daily., Disp: , Rfl:   •  vitamin D (ERGOCALCIFEROL) 73092 units capsule capsule, Take 50,000 Units by mouth 1 (One) Time Per Week., Disp: , Rfl:     The following portions of the patient's history were reviewed and updated as appropriate: allergies, current medications, past family history, past medical history, past social history, past surgical history and problem list.    ROS  Review of Systems   Constitution: Negative for chills, fever, fatigue, generalized weakness.   Cardiovascular: Negative for chest pain, dyspnea on exertion, leg swelling, palpitations, orthopnea, and syncope.   Respiratory: Negative for cough, shortness of breath,  "and wheezing.  HENT: Negative for ear pain, nosebleeds, and tinnitus.  Gastrointestinal: Negative for abdominal pain, constipation, diarrhea, nausea and vomiting.   Genitourinary: No urinary symptoms.  Musculoskeletal: Negative for muscle cramps.  Neurological: Negative for dizziness, headaches, loss of balance, numbness, and symptoms of stroke.  Psychiatric: Normal mental status.     All other systems reviewed and are negative.        Objective:     /72 (BP Location: Left arm, Patient Position: Sitting)   Pulse 53   Ht 157.5 cm (62\")   Wt 84.7 kg (186 lb 12.8 oz)   SpO2 92%   BMI 34.17 kg/m²      Physical Exam  Constitutional: Patient appears well-developed and well-nourished.   HENT: HEENT exam unremarkable.   Neck: Neck supple. No JVD present. No carotid bruits.   Cardiovascular: Normal rate, regular rhythm and normal heart sounds. No murmur heard.   2+ symmetric pulses.   Pulmonary/Chest: Breath sounds normal. Does not exhibit tenderness.   Abdominal: Abdomen benign.   Musculoskeletal: Does not exhibit edema.   Neurological: Neurological exam unremarkable.   Vitals reviewed.    Data Review:     Lab date:04/28/2022  • FLP: , , HDL 34,           ECG 12 Lead    Date/Time: 9/20/2022 1:45 PM  Performed by: Richa Adler MD  Authorized by: Richa Adler MD   Comparison: compared with previous ECG from 12/19/2019  Similar to previous ECG  Comparison to previous ECG: Previously SR  Rhythm: sinus bradycardia  BPM: 53  Other findings: T wave abnormality  Clinical impression comment: borderline ECG                       Assessment:      Diagnosis Plan   1. Coronary artery disease involving native coronary artery of native heart without angina pectoris  Stable without angina on current activity. Continue on aspirin 81 mg for antiplatelet therapy.     2. Essential hypertension  Well controlled. Continue on amlodipine 5 mg, carvedilol 12.5 mg, diovan--25 mg for hypertension.     3. " Dyslipidemia  Continue on Leqvio SC every 6 months for dyslipidemia. Patient was advised to have lipid panel checked again when following up with PCP as most recent lipid panel is not on target. If lipid panel is still not on target, advised patient to discuss reinitiating repatha.    4. Severe obesity (BMI 35.0-39.9) with comorbidity (HCC)  Diet and exercise recommend.      Plan:   Patient was advised to have lipid panel checked again when following up with PCP as most recent lipid panel is not on target. If lipid panel is still not on target, advised patient to discuss reinitiating repatha.   Continue current medications.   Diet exercise and weight loss recommended.  FU in 12 MO, sooner as needed.  Thank you for allowing us to participate in the care of your patient.       Scribed for Richa Adler MD by Adriana Alclaa. 9/20/2022 13:52 EDT         I, Richa Adler MD, personally performed the services described in this documentation as scribed by the above named individual in my presence, and it is both accurate and complete.  9/26/2022  06:49 EDT      Please note that portions of this note may have been completed with a voice recognition program. Efforts were made to edit the dictations, but occasionally words are mistranscribed.

## 2023-10-16 NOTE — PROGRESS NOTES
Patient: Leti Castañeda    YOB: 1945    Date: 10/19/2023    Primary Care Provider: Austyn Tee MD    Chief Complaint   Patient presents with    Mass     L side of neck       SUBJECTIVE:    History of present illness: Patient states that she had a little nodule on her left neck that eventually became quite large and inflamed and drained last week.  Feeling much better.  It was quite painful.  It has been at least 5 years since her last colonoscopy.  She had a colon resection for carcinoma in 2011.  No GI complaints.    The following portions of the patient's history were reviewed and updated as appropriate: allergies, current medications, past family history, past medical history, past social history, past surgical history and problem list.      Review of Systems   Constitutional:  Negative for chills, fever and unexpected weight change.   HENT:  Negative for hearing loss, trouble swallowing and voice change.    Eyes:  Negative for visual disturbance.   Respiratory:  Negative for apnea, cough, chest tightness, shortness of breath and wheezing.    Cardiovascular:  Negative for chest pain, palpitations and leg swelling.   Gastrointestinal:  Negative for abdominal distention, abdominal pain, anal bleeding, blood in stool, constipation, diarrhea, nausea, rectal pain and vomiting.   Endocrine: Negative for cold intolerance and heat intolerance.   Genitourinary:  Negative for difficulty urinating, dysuria and flank pain.   Musculoskeletal:  Negative for back pain and gait problem.   Skin:  Positive for color change and wound. Negative for rash.   Neurological:  Negative for dizziness, syncope, speech difficulty, weakness, light-headedness, numbness and headaches.   Hematological:  Negative for adenopathy. Does not bruise/bleed easily.   Psychiatric/Behavioral:  Negative for confusion. The patient is not nervous/anxious.        Allergies:  Allergies   Allergen Reactions    Codeine GI Intolerance        Medications:    Current Outpatient Medications:     alendronate (FOSAMAX) 70 MG tablet, Take 1 tablet by mouth Every 7 (Seven) Days., Disp: , Rfl:     amLODIPine (NORVASC) 5 MG tablet, Take 1 tablet by mouth Daily., Disp: , Rfl:     anastrozole (ARIMIDEX) 1 MG tablet, Take 1 tablet by mouth Daily., Disp: , Rfl:     aspirin 81 MG chewable tablet, Chew 1 tablet Daily., Disp: , Rfl:     calcium carbonate (OS-SHERRI) 600 MG tablet, Take 1 tablet by mouth Daily., Disp: , Rfl:     carvedilol (COREG) 12.5 MG tablet, Take 1 tablet by mouth 2 (Two) Times a Day., Disp: 180 tablet, Rfl: 3    clobetasol (CLOBEX) 0.05 % lotion, Apply  topically to the appropriate area as directed 2 (Two) Times a Day., Disp: , Rfl:     Inclisiran Sodium (LEQVIO SC), Inject  under the skin into the appropriate area as directed Every 6 (Six) Months., Disp: , Rfl:     levocetirizine (XYZAL) 5 MG tablet, Take 1 tablet by mouth Every Evening., Disp: , Rfl:     meclizine (ANTIVERT) 25 MG tablet, Take 1 tablet by mouth Daily As Needed for Dizziness., Disp: , Rfl:     montelukast (SINGULAIR) 10 MG tablet, Take 1 tablet by mouth Every Night., Disp: , Rfl:     omeprazole (priLOSEC) 20 MG capsule, Take 2 capsules by mouth Daily., Disp: , Rfl:     ondansetron (ZOFRAN) 4 MG tablet, Take 1 tablet by mouth Every 8 (Eight) Hours As Needed for Nausea or Vomiting., Disp: , Rfl:     PARoxetine (PAXIL) 10 MG tablet, Take 1 tablet by mouth Every Morning., Disp: , Rfl:     valsartan-hydrochlorothiazide (DIOVAN-HCT) 320-25 MG per tablet, Take 1 tablet by mouth Daily., Disp: , Rfl:     vitamin B-12 (CYANOCOBALAMIN) 1000 MCG tablet, Take 1 tablet by mouth Daily., Disp: , Rfl:     vitamin D (ERGOCALCIFEROL) 83107 units capsule capsule, Take 1 capsule by mouth 1 (One) Time Per Week., Disp: , Rfl:     Guselkumab (TREMFYA SC), Inject  under the skin into the appropriate area as directed Every 30 (Thirty) Days. (Patient not taking: Reported on 10/19/2023), Disp: , Rfl:  "    History:  Past Medical History:   Diagnosis Date    Abnormal ECG     Arthritis     Cancer     HX OF COLON     Cirrhosis     Coronary artery disease     Depression     Fatty liver     GERD (gastroesophageal reflux disease)     Histoplasmosis     History of transfusion     Hypertension     IBS (irritable bowel syndrome)     Myocardial infarction 08/30/2019    Osteoporosis     Pancreatitis     WAS TOLD IN [place]. POSSIBLE HEP . B IN 2010 THIS WAS AT A URGENT CARE [place]    Right wrist sprain     Stroke        Past Surgical History:   Procedure Laterality Date    ABDOMINAL AORTA STENT      PATIENT STATES DOES NOT HAVE     BREAST BIOPSY      BREAST LUMPECTOMY      BREAST LUMPECTOMY Right     CARDIAC CATHETERIZATION      CHOLECYSTECTOMY      COLON SURGERY      CORONARY ANGIOPLASTY WITH STENT PLACEMENT  2001    KNEE ARTHROSCOPY Right 05/30/2018    Procedure: Diagnostic arthroscopy right knee partial lateral meniscectomy;  Surgeon: Luciano Terry MD;  Location: [facility];  Service: Orthopedics    OTHER SURGICAL HISTORY      PORTACATH REMOVAL    PORTACATH PLACEMENT         Family History   Problem Relation Age of Onset    Hypertension Other     Diabetes Other     Stroke Mother     Cancer Father        Social History     Tobacco Use    Smoking status: Never    Smokeless tobacco: Never   Vaping Use    Vaping Use: Never used   Substance Use Topics    Alcohol use: No    Drug use: No        OBJECTIVE:    Vital Signs:   Vitals:    10/19/23 1251   BP: 128/68   Pulse: 58   Temp: 96.6 °F (35.9 °C)   SpO2: 95%   Weight: 80.9 kg (178 lb 6.4 oz)   Height: 157.5 cm (62.01\")       Physical Exam:   General Appearance:    Alert, cooperative, in no acute distress   Head:    Normocephalic, without obvious abnormality, atraumatic   Eyes:            Normal.  No scleral icterus.  PERRLA    Lungs:     Clear to auscultation,respirations regular, even and                  unlabored    Heart:    Regular rhythm and normal rate, normal S1 and S2, no   "          murmur   Abdomen:     Normal bowel sounds, no masses, no organomegaly, soft        non-tender, non-distended, no guarding,    Extremities:   Moves all extremities well, no edema, no cyanosis, no             redness   Skin: Resolving inflammatory process in the base of the left neck consistent with sebaceous cyst   Neurologic:   Normal without gross deficits.   Psychiatric: No evidence of depression or anxiety        Results Review:   None    Review of Systems was reviewed and confirmed as accurate as documented by the MA.    ASSESSMENT/PLAN:    1. Neck mass    2. Inflamed sebaceous cyst    3. Personal history of colon cancer      I explained the diagnosis to her.  There is a risk that this can recur.  I recommend excising this formally.  She understands procedure as well as the risk of bleeding and infection.  She also is due for colonoscopy since it has been greater than 5 years since her last and she has a history of colon cancer.  I explained the procedure to the patient as well as the risks of bleeding and perforation and they understand the ramifications of these potential complications and they wish to proceed.        Electronically signed by Vinod Patiño MD  10/19/23

## 2023-10-19 ENCOUNTER — OFFICE VISIT (OUTPATIENT)
Dept: SURGERY | Facility: CLINIC | Age: 78
End: 2023-10-19
Payer: MEDICARE

## 2023-10-19 VITALS
DIASTOLIC BLOOD PRESSURE: 68 MMHG | WEIGHT: 178.4 LBS | HEIGHT: 62 IN | BODY MASS INDEX: 32.83 KG/M2 | SYSTOLIC BLOOD PRESSURE: 128 MMHG | TEMPERATURE: 96.6 F | HEART RATE: 58 BPM | OXYGEN SATURATION: 95 %

## 2023-10-19 DIAGNOSIS — R22.1 NECK MASS: Primary | ICD-10-CM

## 2023-10-19 DIAGNOSIS — Z85.038 PERSONAL HISTORY OF COLON CANCER: ICD-10-CM

## 2023-10-19 DIAGNOSIS — L72.3 INFLAMED SEBACEOUS CYST: ICD-10-CM

## 2023-10-19 PROCEDURE — 3074F SYST BP LT 130 MM HG: CPT | Performed by: SURGERY

## 2023-10-19 PROCEDURE — 99203 OFFICE O/P NEW LOW 30 MIN: CPT | Performed by: SURGERY

## 2023-10-19 PROCEDURE — 3078F DIAST BP <80 MM HG: CPT | Performed by: SURGERY

## 2023-10-19 PROCEDURE — 1160F RVW MEDS BY RX/DR IN RCRD: CPT | Performed by: SURGERY

## 2023-10-19 PROCEDURE — 1159F MED LIST DOCD IN RCRD: CPT | Performed by: SURGERY

## 2023-10-19 RX ORDER — ONDANSETRON 4 MG/1
4 TABLET, FILM COATED ORAL EVERY 8 HOURS PRN
COMMUNITY

## 2023-10-19 RX ORDER — POLYETHYLENE GLYCOL 3350 17 G/17G
238 POWDER, FOR SOLUTION ORAL ONCE
Qty: 17 PACKET | Refills: 0 | Status: SHIPPED | OUTPATIENT
Start: 2023-10-19 | End: 2023-10-19

## 2023-10-19 RX ORDER — BISACODYL 5 MG/1
5 TABLET, DELAYED RELEASE ORAL TAKE AS DIRECTED
Qty: 4 TABLET | Refills: 0 | Status: SHIPPED | OUTPATIENT
Start: 2023-10-19 | End: 2024-10-18

## 2023-10-20 NOTE — PROGRESS NOTES
Location:left neck     Procedure:cyst excision inflamed sebaceous cyst      I recommend excision. Procedure and the risks and benefits were explained including bleeding and infection. The patient understands these and wishes to proceed.     The patient was brought to the procedure room. Consent and time out were performed. The area was prepped and draped in the usual fashion. 1% lidocaine with epinephrine was infused locally. An ellyptical incision was made around the lesion. Full thickness excision was performed. The lesion size was 1.5 cm. The wound was closed in layers with interrupted simple vicryl and PDS for the skin. Wound closure size was 3 cm. There were no complications and the patient tolerated the procedure well. Hemostasis was well controlled with pressure and there was minimal blood loss. Wound instructions were given.

## 2023-10-23 ENCOUNTER — PROCEDURE VISIT (OUTPATIENT)
Dept: SURGERY | Facility: CLINIC | Age: 78
End: 2023-10-23
Payer: MEDICARE

## 2023-10-23 VITALS
OXYGEN SATURATION: 93 % | RESPIRATION RATE: 10 BRPM | TEMPERATURE: 98.8 F | BODY MASS INDEX: 32.87 KG/M2 | SYSTOLIC BLOOD PRESSURE: 141 MMHG | HEIGHT: 62 IN | WEIGHT: 178.6 LBS | DIASTOLIC BLOOD PRESSURE: 78 MMHG | HEART RATE: 58 BPM

## 2023-10-23 DIAGNOSIS — L72.3 INFLAMED SEBACEOUS CYST: Primary | ICD-10-CM

## 2023-11-08 ENCOUNTER — TELEPHONE (OUTPATIENT)
Dept: SURGERY | Facility: CLINIC | Age: 78
End: 2023-11-08
Payer: MEDICARE

## 2023-11-13 ENCOUNTER — OUTSIDE FACILITY SERVICE (OUTPATIENT)
Dept: SURGERY | Facility: CLINIC | Age: 78
End: 2023-11-13
Payer: MEDICARE

## 2023-11-14 ENCOUNTER — OFFICE VISIT (OUTPATIENT)
Dept: CARDIOLOGY | Facility: CLINIC | Age: 78
End: 2023-11-14
Payer: MEDICARE

## 2023-11-14 VITALS
HEIGHT: 62 IN | OXYGEN SATURATION: 97 % | HEART RATE: 57 BPM | SYSTOLIC BLOOD PRESSURE: 92 MMHG | WEIGHT: 181 LBS | DIASTOLIC BLOOD PRESSURE: 60 MMHG | BODY MASS INDEX: 33.31 KG/M2

## 2023-11-14 DIAGNOSIS — I25.10 CORONARY ARTERY DISEASE INVOLVING NATIVE CORONARY ARTERY OF NATIVE HEART WITHOUT ANGINA PECTORIS: Primary | ICD-10-CM

## 2023-11-14 DIAGNOSIS — I10 ESSENTIAL HYPERTENSION: ICD-10-CM

## 2023-11-14 DIAGNOSIS — E78.5 DYSLIPIDEMIA: ICD-10-CM

## 2023-11-14 NOTE — PROGRESS NOTES
[facility]    Encounter Date: 2023    Patient ID: Leti Castañeda is a 78 y.o. female.  : 1945     PCP: Austyn Tee MD       Chief Complaint: No chief complaint on file.      PROBLEM LIST:  Coronary artery disease:  Remote stent in .  NSTEMI/LHC, 2019, [facility]: OSITO to mid-LAD. EF 45%.  CVA, 2019:  Patient presented to [facility] ED 19 with complaints of gait instability, numbness in right arm and leg. Transferred to [facility] for higher level of care.   MRI, 19: Acute left thalamic infarction with severe chronic small vessel disease.   CTA head, 2019: 10-20% right ICA stenosis.  Echocardiogram, 2019: Saline test results are negative. EF 60% with mild concentric LVH. Grade I diastolic dysfunction. Trace-to-mild TR with RVSP 30 mmHg. Trace AI.  Hypertension.  Dyslipidemia:  Statin intolerance due to elevated liver enzymes  On Repatha.  Chronic pancreatitis.  GERD.  Non-alcoholic cirrhosis.  Breast cancer  S/p right lumpectomy 2021.    History of Present Illness  Patient presents today for 1 year follow-up with a history of coronary artery disease, and cardiac risk factors. Since last visit, she has done well from cardiac standpoint.  Denies chest pain shortness of breath edema orthopnea PND palpitations dizziness or syncope.  She had a colonoscopy yesterday and after the prep has been feeling a little lethargic.  The family reports that her amlodipine was changed to 1 tablet twice a day sometime last year although it is still listed as once daily medication in the records with us.     Patient denies chest pain, shortness of breath, orthopnea, palpitations, edema, dizziness, and syncope.       Allergies   Allergen Reactions    Codeine GI Intolerance         Current Outpatient Medications:     alendronate (FOSAMAX) 70 MG tablet, Take 1 tablet by mouth Every 7 (Seven) Days., Disp: , Rfl:     amLODIPine (NORVASC) 5 MG  tablet, Take 1 tablet by mouth Daily., Disp: , Rfl:     anastrozole (ARIMIDEX) 1 MG tablet, Take 1 tablet by mouth Daily., Disp: , Rfl:     aspirin 81 MG chewable tablet, Chew 1 tablet Daily., Disp: , Rfl:     bisacodyl (Dulcolax) 5 MG EC tablet, Take 1 tablet by mouth Take As Directed. Take 2 tablets 3pm and take 2 tablets at 5pm, Disp: 4 tablet, Rfl: 0    calcium carbonate (OS-SHERRI) 600 MG tablet, Take 1 tablet by mouth Daily., Disp: , Rfl:     carvedilol (COREG) 12.5 MG tablet, Take 1 tablet by mouth 2 (Two) Times a Day., Disp: 180 tablet, Rfl: 3    clobetasol (CLOBEX) 0.05 % lotion, Apply  topically to the appropriate area as directed 2 (Two) Times a Day., Disp: , Rfl:     Guselkumab (TREMFYA SC), Inject  under the skin into the appropriate area as directed Every 30 (Thirty) Days., Disp: , Rfl:     Inclisiran Sodium (LEQVIO SC), Inject  under the skin into the appropriate area as directed Every 6 (Six) Months., Disp: , Rfl:     levocetirizine (XYZAL) 5 MG tablet, Take 1 tablet by mouth Every Evening., Disp: , Rfl:     meclizine (ANTIVERT) 25 MG tablet, Take 1 tablet by mouth Daily As Needed for Dizziness., Disp: , Rfl:     montelukast (SINGULAIR) 10 MG tablet, Take 1 tablet by mouth Every Night., Disp: , Rfl:     omeprazole (priLOSEC) 20 MG capsule, Take 2 capsules by mouth Daily., Disp: , Rfl:     ondansetron (ZOFRAN) 4 MG tablet, Take 1 tablet by mouth Every 8 (Eight) Hours As Needed for Nausea or Vomiting., Disp: , Rfl:     PARoxetine (PAXIL) 10 MG tablet, Take 1 tablet by mouth Every Morning., Disp: , Rfl:     valsartan-hydrochlorothiazide (DIOVAN-HCT) 320-25 MG per tablet, Take 1 tablet by mouth Daily., Disp: , Rfl:     vitamin B-12 (CYANOCOBALAMIN) 1000 MCG tablet, Take 1 tablet by mouth Daily., Disp: , Rfl:     vitamin D (ERGOCALCIFEROL) 00119 units capsule capsule, Take 1 capsule by mouth 1 (One) Time Per Week., Disp: , Rfl:     The following portions of the patient's history were reviewed and updated as  appropriate: allergies, current medications, past family history, past medical history, past social history, past surgical history and problem list.    ROS  Review of Systems   14 point ROS negative except for that listed in the HPI.         Objective:     There were no vitals taken for this visit.     Physical Exam  Constitutional: Patient appears well-developed and well-nourished.   HENT: HEENT exam unremarkable.   Neck: Neck supple. No JVD present. No carotid bruits.   Cardiovascular: Normal rate, regular rhythm and normal heart sounds. No murmur heard.   2+ symmetric pulses.   Pulmonary/Chest: Breath sounds normal. Does not exhibit tenderness.   Abdominal: Abdomen benign.   Musculoskeletal: Does not exhibit edema.   Neurological: Neurological exam unremarkable.   Vitals reviewed.    Data Review:     Lab date:04/28/2022  FLP: , , HDL 34,      Procedures             Assessment:      Diagnosis Plan   1. Coronary artery disease involving native coronary artery of native heart without angina pectoris  Stable, continue low-dose aspirin and rest of the current GDMT.        2. Essential hypertension  Blood pressure is low with symptoms of fatigue.  We will change amlodipine back to 5 mg daily.      3. Dyslipidemia  Continue inclisiran.        Plan:   Due to fatigue change amlodipine back to 5 mg daily at bedtime.  Monitor blood pressure at home and if it continues to run low with symptoms we will completely eliminate amlodipine.  Continue all other current medications.   FU in 12 MO, sooner as needed.  Thank you for allowing us to participate in the care of your patient.     Scribed for Richa Adler MD by Edith Rivera. 11/14/2023 08:44 EST      I, Richa Adler MD, personally performed the services described in this documentation as scribed by the above named individual in my presence, and it is both accurate and complete.  11/14/2023  11:44 EST      Please note that portions of this note may have  been completed with a voice recognition program. Efforts were made to edit the dictations, but occasionally words are mistranscribed.

## 2024-11-12 ENCOUNTER — OFFICE VISIT (OUTPATIENT)
Dept: CARDIOLOGY | Facility: CLINIC | Age: 79
End: 2024-11-12
Payer: MEDICARE

## 2024-11-12 VITALS
HEART RATE: 60 BPM | SYSTOLIC BLOOD PRESSURE: 138 MMHG | HEIGHT: 62 IN | BODY MASS INDEX: 33.68 KG/M2 | DIASTOLIC BLOOD PRESSURE: 70 MMHG | OXYGEN SATURATION: 91 % | WEIGHT: 183 LBS

## 2024-11-12 DIAGNOSIS — I25.10 CORONARY ARTERY DISEASE INVOLVING NATIVE CORONARY ARTERY OF NATIVE HEART WITHOUT ANGINA PECTORIS: Primary | ICD-10-CM

## 2024-11-12 DIAGNOSIS — I10 ESSENTIAL HYPERTENSION: ICD-10-CM

## 2024-11-12 DIAGNOSIS — E78.5 DYSLIPIDEMIA: ICD-10-CM

## 2024-11-12 PROCEDURE — 99214 OFFICE O/P EST MOD 30 MIN: CPT | Performed by: INTERNAL MEDICINE

## 2024-11-12 PROCEDURE — 93000 ELECTROCARDIOGRAM COMPLETE: CPT | Performed by: INTERNAL MEDICINE

## 2024-11-12 PROCEDURE — 3078F DIAST BP <80 MM HG: CPT | Performed by: INTERNAL MEDICINE

## 2024-11-12 PROCEDURE — 1160F RVW MEDS BY RX/DR IN RCRD: CPT | Performed by: INTERNAL MEDICINE

## 2024-11-12 PROCEDURE — 1159F MED LIST DOCD IN RCRD: CPT | Performed by: INTERNAL MEDICINE

## 2024-11-12 PROCEDURE — 3075F SYST BP GE 130 - 139MM HG: CPT | Performed by: INTERNAL MEDICINE

## 2024-11-12 NOTE — PROGRESS NOTES
[facility]    Encounter Date: 2024    Patient ID: Leti Castañeda is a 79 y.o. female.  : 1945     PCP: Austyn Tee MD       Chief Complaint: Coronary Artery Disease (Coronary artery disease involving native coronary artery of native heart without angina pectoris)      PROBLEM LIST:  Coronary artery disease:  Remote stent in .  NSTEMI/LHC, 2019, [facility]: OSITO to mid-LAD. EF 45%.  CVA, 2019:  Patient presented to [facility] ED 19 with complaints of gait instability, numbness in right arm and leg. Transferred to [facility] for higher level of care.   MRI, 19: Acute left thalamic infarction with severe chronic small vessel disease.   CTA head, 2019: 10-20% right ICA stenosis.  Echocardiogram, 2019: Saline test results are negative. EF 60% with mild concentric LVH. Grade I diastolic dysfunction. Trace-to-mild TR with RVSP 30 mmHg. Trace AI.  Hypertension.  Dyslipidemia:  Statin intolerance due to elevated liver enzymes  On Repatha.  Chronic pancreatitis.  GERD.  Non-alcoholic cirrhosis.  Breast cancer  S/p right lumpectomy 2021.    History of Present Illness  Patient presents today for a 1 year follow-up with a history of CAD and cardiac risk factors. Since last visit, patient has been doing well overall from a cardiovascular standpoint. She has not had any recent cardiac related hospital admissions or ED visits. Patient reports she was admitted in the hospital 9 months ago due to the flue. She has occasional shortness of breath. Patient monitors her blood pressure intermittently at home and notes her hypertensive medications were recently adjusted by her PCP as her SBP was too low. Patient denies chest pain, orthopnea, palpitations, edema, dizziness, and syncope.     Allergies   Allergen Reactions    Codeine GI Intolerance         Current Outpatient Medications:     Adalimumab (HUMIRA SC), Inject 40 mg under the skin into  the appropriate area as directed Every 14 (Fourteen) Days., Disp: , Rfl:     alendronate (FOSAMAX) 70 MG tablet, Take 1 tablet by mouth Every 7 (Seven) Days., Disp: , Rfl:     amLODIPine (NORVASC) 5 MG tablet, Take 1 tablet by mouth Daily., Disp: , Rfl:     anastrozole (ARIMIDEX) 1 MG tablet, Take 1 tablet by mouth Daily., Disp: , Rfl:     aspirin 81 MG chewable tablet, Chew 1 tablet Daily., Disp: , Rfl:     calcium carbonate (OS-SHERRI) 600 MG tablet, Take 1 tablet by mouth Daily., Disp: , Rfl:     carvedilol (COREG) 12.5 MG tablet, Take 1 tablet by mouth 2 (Two) Times a Day., Disp: 180 tablet, Rfl: 3    clobetasol (CLOBEX) 0.05 % lotion, Apply  topically to the appropriate area as directed 2 (Two) Times a Day., Disp: , Rfl:     Guselkumab (TREMFYA SC), Inject  under the skin into the appropriate area as directed Every 30 (Thirty) Days., Disp: , Rfl:     Inclisiran Sodium (LEQVIO SC), Inject  under the skin into the appropriate area as directed Every 6 (Six) Months., Disp: , Rfl:     levocetirizine (XYZAL) 5 MG tablet, Take 1 tablet by mouth Every Evening., Disp: , Rfl:     meclizine (ANTIVERT) 25 MG tablet, Take 1 tablet by mouth Daily As Needed for Dizziness., Disp: , Rfl:     montelukast (SINGULAIR) 10 MG tablet, Take 1 tablet by mouth Every Night., Disp: , Rfl:     omeprazole (priLOSEC) 20 MG capsule, Take 2 capsules by mouth Daily., Disp: , Rfl:     ondansetron (ZOFRAN) 4 MG tablet, Take 1 tablet by mouth Every 8 (Eight) Hours As Needed for Nausea or Vomiting., Disp: , Rfl:     PARoxetine (PAXIL) 10 MG tablet, Take 1 tablet by mouth Every Morning., Disp: , Rfl:     valsartan-hydrochlorothiazide (DIOVAN-HCT) 320-25 MG per tablet, Take 1 tablet by mouth Daily., Disp: , Rfl:     vitamin B-12 (CYANOCOBALAMIN) 1000 MCG tablet, Take 1 tablet by mouth Daily., Disp: , Rfl:     vitamin D (ERGOCALCIFEROL) 97198 units capsule capsule, Take 1 capsule by mouth 1 (One) Time Per Week., Disp: , Rfl:     The following portions of  "the patient's history were reviewed and updated as appropriate: allergies, current medications, past family history, past medical history, past social history, past surgical history and problem list.    ROS  Review of Systems   14 point ROS negative except for that listed in the HPI.         Objective:     /94 (BP Location: Right arm, Patient Position: Sitting)   Pulse 60   Ht 157.5 cm (62\")   Wt 83 kg (183 lb)   SpO2 91%   BMI 33.47 kg/m²      Physical Exam  Constitutional: Patient appears well-developed and well-nourished.   HENT: HEENT exam unremarkable.   Neck: Neck supple. No JVD present. No carotid bruits.   Cardiovascular: Normal rate, regular rhythm and normal heart sounds. No murmur heard.   2+ symmetric pulses.   Pulmonary/Chest: Breath sounds normal. Does not exhibit tenderness.   Abdominal: Abdomen benign.   Musculoskeletal: Does not exhibit edema.   Neurological: Neurological exam unremarkable.   Vitals reviewed.    Data Review:   No recent laboratory studies available for review today.       ECG 12 Lead    Date/Time: 11/12/2024 11:24 AM  Performed by: Richa Adler MD    Authorized by: Richa Adler MD  Comparison: compared with previous ECG from 9/20/2022  Similar to previous ECG  Rhythm: sinus bradycardia  BPM: 55  Other findings: non-specific ST-T wave changes and low voltage  Comments: Borderline ECG             Advance Care Planning   ACP discussion was declined by the patient. Patient does not have an advance directive, declines further assistance.           Assessment:      Diagnosis Plan   1. Coronary artery disease involving native coronary artery of native heart without angina pectoris  Stable without angina on current activity. Continue on aspirin 81 mg for antiplatelet therapy.       2. Essential hypertension  Well controlled. Continue on amlodipine 5 mg daily for hypertension. Continue on valsartan HCTZ 320-25 mg daily for hypertension. Continue on carvedilol 12.5 mg BID for " hypertension.      3. Dyslipidemia with statin intolerance. No labs for review.  Continue Leqvio and follow-up with PCP for monitoring.        Plan:   Stable cardiac status.  No current angina or CHF symptoms.  Continue current medications.   Heart healthy diet and regular exercise recommended.  FU in 12 MO, sooner as needed.  Thank you for allowing us to participate in the care of your patient.     Scribed for Richa Adler MD by Diane Hill. 11/12/2024 11:23 EST    I, Richa Adler MD, personally performed the services described in this documentation as scribed by the above named individual in my presence, and it is both accurate and complete.  11/12/2024  19:33 EST      Please note that portions of this note may have been completed with a voice recognition program. Efforts were made to edit the dictations, but occasionally words are mistranscribed.

## (undated) DEVICE — 3.5 MM FULL RADIUS STRAIGHT                                    BLADES, POWER/EP-1, BEIGE, PACKAGED                                    6 PER BOX, STERILE

## (undated) DEVICE — EMERALD ARTHROSCOPY SHEET: Brand: CONVERTORS

## (undated) DEVICE — NDL SPINE 20G 3 1/2 YEL STRL 1P/U

## (undated) DEVICE — SUT ETHLN 3-0 FS118IN 663H

## (undated) DEVICE — GLV SURG TRIUMPH PF LTX 8 STRL

## (undated) DEVICE — APPL CHLORAPREP W/TINT 10.5ML ORNG BX25

## (undated) DEVICE — DRSNG WND GZ CURAD OIL EMULSION 3X3IN STRL

## (undated) DEVICE — BNDG ELAS ELITE V/CLOSE 6IN 5YD LF STRL

## (undated) DEVICE — SPNG GZ WOVN 4X4IN 12PLY 10/BX STRL

## (undated) DEVICE — PK KN ARTHSCP 20

## (undated) DEVICE — PAD,ABDOMINAL,5"X9",STERILE,LF,1/PK: Brand: MEDLINE INDUSTRIES, INC.

## (undated) DEVICE — DYONICS 25 PATIENT TUBE SET MUST                                    BE USED WITH 7211007, 12 PER BOX

## (undated) DEVICE — GLV SURG SENSICARE SLT PF LF 7.5 STRL

## (undated) DEVICE — POSTN SURG LEG WELL LOW EXTREM 1P/U

## (undated) DEVICE — DRSNG ADAPTIC 3X8